# Patient Record
Sex: FEMALE | Race: WHITE | Employment: OTHER | URBAN - METROPOLITAN AREA
[De-identification: names, ages, dates, MRNs, and addresses within clinical notes are randomized per-mention and may not be internally consistent; named-entity substitution may affect disease eponyms.]

---

## 2021-03-22 ENCOUNTER — APPOINTMENT (OUTPATIENT)
Dept: GENERAL RADIOLOGY | Age: 76
DRG: 690 | End: 2021-03-22
Payer: MEDICARE

## 2021-03-22 ENCOUNTER — HOSPITAL ENCOUNTER (EMERGENCY)
Age: 76
Discharge: HOME | DRG: 690 | End: 2021-03-23
Payer: MEDICARE

## 2021-03-22 DIAGNOSIS — N39.0 URINARY TRACT INFECTION WITH HEMATURIA, SITE UNSPECIFIED: Primary | ICD-10-CM

## 2021-03-22 DIAGNOSIS — R31.9 URINARY TRACT INFECTION WITH HEMATURIA, SITE UNSPECIFIED: Primary | ICD-10-CM

## 2021-03-22 DIAGNOSIS — R11.2 NON-INTRACTABLE VOMITING WITH NAUSEA, UNSPECIFIED VOMITING TYPE: ICD-10-CM

## 2021-03-22 LAB
ALBUMIN SERPL-MCNC: 4 G/DL (ref 3.5–4.6)
ALP BLD-CCNC: 163 U/L (ref 40–130)
ALT SERPL-CCNC: 19 U/L (ref 0–33)
ANION GAP SERPL CALCULATED.3IONS-SCNC: 16 MEQ/L (ref 9–15)
AST SERPL-CCNC: 36 U/L (ref 0–35)
BACTERIA: ABNORMAL /HPF
BASOPHILS ABSOLUTE: 0 K/UL (ref 0–0.2)
BASOPHILS RELATIVE PERCENT: 0.4 %
BILIRUB SERPL-MCNC: 0.7 MG/DL (ref 0.2–0.7)
BILIRUBIN URINE: NEGATIVE
BLOOD, URINE: NEGATIVE
BUN BLDV-MCNC: 20 MG/DL (ref 8–23)
CALCIUM SERPL-MCNC: 9.2 MG/DL (ref 8.5–9.9)
CHLORIDE BLD-SCNC: 99 MEQ/L (ref 95–107)
CLARITY: CLEAR
CO2: 21 MEQ/L (ref 20–31)
COLOR: YELLOW
CREAT SERPL-MCNC: 1.24 MG/DL (ref 0.5–0.9)
EOSINOPHILS ABSOLUTE: 0.3 K/UL (ref 0–0.7)
EOSINOPHILS RELATIVE PERCENT: 2.1 %
EPITHELIAL CELLS, UA: ABNORMAL /HPF (ref 0–5)
GFR AFRICAN AMERICAN: 50.8
GFR NON-AFRICAN AMERICAN: 42
GLOBULIN: 3.1 G/DL (ref 2.3–3.5)
GLUCOSE BLD-MCNC: 101 MG/DL (ref 70–99)
GLUCOSE URINE: NEGATIVE MG/DL
HCT VFR BLD CALC: 41.5 % (ref 37–47)
HEMOGLOBIN: 13.7 G/DL (ref 12–16)
HYALINE CASTS: ABNORMAL /HPF (ref 0–5)
KETONES, URINE: NEGATIVE MG/DL
LEUKOCYTE ESTERASE, URINE: ABNORMAL
LIPASE: 30 U/L (ref 12–95)
LYMPHOCYTES ABSOLUTE: 1.2 K/UL (ref 1–4.8)
LYMPHOCYTES RELATIVE PERCENT: 10.6 %
MCH RBC QN AUTO: 29.6 PG (ref 27–31.3)
MCHC RBC AUTO-ENTMCNC: 32.9 % (ref 33–37)
MCV RBC AUTO: 89.7 FL (ref 82–100)
MONOCYTES ABSOLUTE: 0.6 K/UL (ref 0.2–0.8)
MONOCYTES RELATIVE PERCENT: 5.5 %
NEUTROPHILS ABSOLUTE: 9.5 K/UL (ref 1.4–6.5)
NEUTROPHILS RELATIVE PERCENT: 81.4 %
NITRITE, URINE: NEGATIVE
PDW BLD-RTO: 13.1 % (ref 11.5–14.5)
PH UA: 6 (ref 5–9)
PLATELET # BLD: 161 K/UL (ref 130–400)
POTASSIUM SERPL-SCNC: 3.4 MEQ/L (ref 3.4–4.9)
PROTEIN UA: 30 MG/DL
RBC # BLD: 4.62 M/UL (ref 4.2–5.4)
RBC UA: ABNORMAL /HPF (ref 0–5)
SODIUM BLD-SCNC: 136 MEQ/L (ref 135–144)
SPECIFIC GRAVITY UA: 1.01 (ref 1–1.03)
TOTAL PROTEIN: 7.1 G/DL (ref 6.3–8)
URINE REFLEX TO CULTURE: YES
UROBILINOGEN, URINE: 1 E.U./DL
WBC # BLD: 11.7 K/UL (ref 4.8–10.8)
WBC UA: >100 /HPF (ref 0–5)

## 2021-03-22 PROCEDURE — 83605 ASSAY OF LACTIC ACID: CPT

## 2021-03-22 PROCEDURE — 36415 COLL VENOUS BLD VENIPUNCTURE: CPT

## 2021-03-22 PROCEDURE — 2580000003 HC RX 258: Performed by: PHYSICIAN ASSISTANT

## 2021-03-22 PROCEDURE — 6360000002 HC RX W HCPCS: Performed by: PHYSICIAN ASSISTANT

## 2021-03-22 PROCEDURE — 71045 X-RAY EXAM CHEST 1 VIEW: CPT

## 2021-03-22 PROCEDURE — 87040 BLOOD CULTURE FOR BACTERIA: CPT

## 2021-03-22 PROCEDURE — 80053 COMPREHEN METABOLIC PANEL: CPT

## 2021-03-22 PROCEDURE — 6370000000 HC RX 637 (ALT 250 FOR IP): Performed by: PHYSICIAN ASSISTANT

## 2021-03-22 PROCEDURE — 87186 SC STD MICRODIL/AGAR DIL: CPT

## 2021-03-22 PROCEDURE — 83690 ASSAY OF LIPASE: CPT

## 2021-03-22 PROCEDURE — 81001 URINALYSIS AUTO W/SCOPE: CPT

## 2021-03-22 PROCEDURE — 87149 DNA/RNA DIRECT PROBE: CPT

## 2021-03-22 PROCEDURE — 85025 COMPLETE CBC W/AUTO DIFF WBC: CPT

## 2021-03-22 PROCEDURE — 87077 CULTURE AEROBIC IDENTIFY: CPT

## 2021-03-22 PROCEDURE — 87086 URINE CULTURE/COLONY COUNT: CPT

## 2021-03-22 RX ORDER — KETOROLAC TROMETHAMINE 15 MG/ML
15 INJECTION, SOLUTION INTRAMUSCULAR; INTRAVENOUS ONCE
Status: COMPLETED | OUTPATIENT
Start: 2021-03-22 | End: 2021-03-22

## 2021-03-22 RX ORDER — ONDANSETRON 4 MG/1
4 TABLET, ORALLY DISINTEGRATING ORAL ONCE
Status: COMPLETED | OUTPATIENT
Start: 2021-03-22 | End: 2021-03-22

## 2021-03-22 RX ORDER — 0.9 % SODIUM CHLORIDE 0.9 %
1000 INTRAVENOUS SOLUTION INTRAVENOUS ONCE
Status: COMPLETED | OUTPATIENT
Start: 2021-03-22 | End: 2021-03-22

## 2021-03-22 RX ADMIN — KETOROLAC TROMETHAMINE 15 MG: 15 INJECTION, SOLUTION INTRAMUSCULAR; INTRAVENOUS at 23:34

## 2021-03-22 RX ADMIN — ONDANSETRON 4 MG: 4 TABLET, ORALLY DISINTEGRATING ORAL at 21:06

## 2021-03-22 RX ADMIN — SODIUM CHLORIDE 1000 ML: 9 INJECTION, SOLUTION INTRAVENOUS at 21:55

## 2021-03-22 ASSESSMENT — PAIN SCALES - GENERAL
PAINLEVEL_OUTOF10: 7
PAINLEVEL_OUTOF10: 7

## 2021-03-23 ENCOUNTER — HOSPITAL ENCOUNTER (INPATIENT)
Age: 76
LOS: 3 days | Discharge: HOME | DRG: 690 | End: 2021-03-26
Attending: INTERNAL MEDICINE
Payer: MEDICARE

## 2021-03-23 VITALS
RESPIRATION RATE: 16 BRPM | DIASTOLIC BLOOD PRESSURE: 45 MMHG | TEMPERATURE: 98.6 F | HEART RATE: 99 BPM | OXYGEN SATURATION: 93 % | SYSTOLIC BLOOD PRESSURE: 105 MMHG

## 2021-03-23 DIAGNOSIS — R78.81 BACTEREMIA: Primary | ICD-10-CM

## 2021-03-23 DIAGNOSIS — N39.0 URINARY TRACT INFECTION WITHOUT HEMATURIA, SITE UNSPECIFIED: ICD-10-CM

## 2021-03-23 LAB
ANION GAP SERPL CALCULATED.3IONS-SCNC: 13 MEQ/L (ref 9–15)
BASOPHILS ABSOLUTE: 0 K/UL (ref 0–0.2)
BASOPHILS RELATIVE PERCENT: 0.2 %
BUN BLDV-MCNC: 27 MG/DL (ref 8–23)
CALCIUM SERPL-MCNC: 8.9 MG/DL (ref 8.5–9.9)
CHLORIDE BLD-SCNC: 106 MEQ/L (ref 95–107)
CO2: 22 MEQ/L (ref 20–31)
CREAT SERPL-MCNC: 1.47 MG/DL (ref 0.5–0.9)
EOSINOPHILS ABSOLUTE: 0.1 K/UL (ref 0–0.7)
EOSINOPHILS RELATIVE PERCENT: 0.8 %
GFR AFRICAN AMERICAN: 41.8
GFR NON-AFRICAN AMERICAN: 34.5
GLUCOSE BLD-MCNC: 121 MG/DL (ref 70–99)
HCT VFR BLD CALC: 37 % (ref 37–47)
HEMOGLOBIN: 12 G/DL (ref 12–16)
LACTIC ACID: 1.4 MMOL/L (ref 0.5–2.2)
LYMPHOCYTES ABSOLUTE: 1.3 K/UL (ref 1–4.8)
LYMPHOCYTES RELATIVE PERCENT: 9.7 %
MCH RBC QN AUTO: 29.6 PG (ref 27–31.3)
MCHC RBC AUTO-ENTMCNC: 32.5 % (ref 33–37)
MCV RBC AUTO: 91.2 FL (ref 82–100)
MONOCYTES ABSOLUTE: 1.1 K/UL (ref 0.2–0.8)
MONOCYTES RELATIVE PERCENT: 7.9 %
NEUTROPHILS ABSOLUTE: 11.3 K/UL (ref 1.4–6.5)
NEUTROPHILS RELATIVE PERCENT: 81.4 %
PDW BLD-RTO: 13.7 % (ref 11.5–14.5)
PLATELET # BLD: 128 K/UL (ref 130–400)
POTASSIUM SERPL-SCNC: 3.6 MEQ/L (ref 3.4–4.9)
RBC # BLD: 4.05 M/UL (ref 4.2–5.4)
SARS-COV-2, NAAT: NOT DETECTED
SODIUM BLD-SCNC: 141 MEQ/L (ref 135–144)
WBC # BLD: 13.9 K/UL (ref 4.8–10.8)

## 2021-03-23 PROCEDURE — 2580000003 HC RX 258: Performed by: PHYSICIAN ASSISTANT

## 2021-03-23 PROCEDURE — 2060000000 HC ICU INTERMEDIATE R&B

## 2021-03-23 PROCEDURE — 80048 BASIC METABOLIC PNL TOTAL CA: CPT

## 2021-03-23 PROCEDURE — 6360000002 HC RX W HCPCS: Performed by: PHYSICIAN ASSISTANT

## 2021-03-23 PROCEDURE — 99283 EMERGENCY DEPT VISIT LOW MDM: CPT

## 2021-03-23 PROCEDURE — 85025 COMPLETE CBC W/AUTO DIFF WBC: CPT

## 2021-03-23 PROCEDURE — 36415 COLL VENOUS BLD VENIPUNCTURE: CPT

## 2021-03-23 PROCEDURE — 6360000002 HC RX W HCPCS: Performed by: INTERNAL MEDICINE

## 2021-03-23 PROCEDURE — 87635 SARS-COV-2 COVID-19 AMP PRB: CPT

## 2021-03-23 PROCEDURE — 6370000000 HC RX 637 (ALT 250 FOR IP): Performed by: NURSE PRACTITIONER

## 2021-03-23 PROCEDURE — 6370000000 HC RX 637 (ALT 250 FOR IP): Performed by: INTERNAL MEDICINE

## 2021-03-23 PROCEDURE — 2580000003 HC RX 258: Performed by: INTERNAL MEDICINE

## 2021-03-23 RX ORDER — ALPRAZOLAM 1 MG/1
0.5 TABLET ORAL NIGHTLY PRN
Status: DISCONTINUED | OUTPATIENT
Start: 2021-03-24 | End: 2021-03-23

## 2021-03-23 RX ORDER — ALPRAZOLAM 0.5 MG/1
0.5 TABLET ORAL 2 TIMES DAILY PRN
COMMUNITY

## 2021-03-23 RX ORDER — VITAMIN B COMPLEX
2000 TABLET ORAL DAILY
Status: DISCONTINUED | OUTPATIENT
Start: 2021-03-24 | End: 2021-03-26 | Stop reason: HOSPADM

## 2021-03-23 RX ORDER — 0.9 % SODIUM CHLORIDE 0.9 %
500 INTRAVENOUS SOLUTION INTRAVENOUS ONCE
Status: COMPLETED | OUTPATIENT
Start: 2021-03-23 | End: 2021-03-23

## 2021-03-23 RX ORDER — TRAZODONE HYDROCHLORIDE 50 MG/1
50 TABLET ORAL NIGHTLY
COMMUNITY

## 2021-03-23 RX ORDER — TRAZODONE HYDROCHLORIDE 50 MG/1
50 TABLET ORAL NIGHTLY
Status: DISCONTINUED | OUTPATIENT
Start: 2021-03-23 | End: 2021-03-26 | Stop reason: HOSPADM

## 2021-03-23 RX ORDER — INDAPAMIDE 1.25 MG/1
1.25 TABLET, FILM COATED ORAL EVERY MORNING
COMMUNITY

## 2021-03-23 RX ORDER — ONDANSETRON 4 MG/1
4 TABLET, FILM COATED ORAL EVERY 8 HOURS PRN
Qty: 12 TABLET | Refills: 0 | Status: SHIPPED | OUTPATIENT
Start: 2021-03-23

## 2021-03-23 RX ORDER — ALPRAZOLAM 0.25 MG/1
0.5 TABLET ORAL NIGHTLY PRN
Status: DISCONTINUED | OUTPATIENT
Start: 2021-03-23 | End: 2021-03-26 | Stop reason: HOSPADM

## 2021-03-23 RX ORDER — CEPHALEXIN 500 MG/1
500 CAPSULE ORAL 2 TIMES DAILY
Qty: 20 CAPSULE | Refills: 0 | Status: ON HOLD | OUTPATIENT
Start: 2021-03-23 | End: 2021-03-26 | Stop reason: HOSPADM

## 2021-03-23 RX ORDER — L. ACIDOPHILUS/L.BULGARICUS 1MM CELL
1 TABLET ORAL DAILY
Status: DISCONTINUED | OUTPATIENT
Start: 2021-03-24 | End: 2021-03-26 | Stop reason: HOSPADM

## 2021-03-23 RX ORDER — ROSUVASTATIN CALCIUM 40 MG/1
40 TABLET, COATED ORAL DAILY
Status: DISCONTINUED | OUTPATIENT
Start: 2021-03-24 | End: 2021-03-26 | Stop reason: HOSPADM

## 2021-03-23 RX ORDER — IBUPROFEN 200 MG
600 TABLET ORAL EVERY 6 HOURS PRN
COMMUNITY

## 2021-03-23 RX ORDER — SODIUM CHLORIDE, SODIUM LACTATE, POTASSIUM CHLORIDE, CALCIUM CHLORIDE 600; 310; 30; 20 MG/100ML; MG/100ML; MG/100ML; MG/100ML
INJECTION, SOLUTION INTRAVENOUS CONTINUOUS
Status: DISCONTINUED | OUTPATIENT
Start: 2021-03-23 | End: 2021-03-25

## 2021-03-23 RX ORDER — ROSUVASTATIN CALCIUM 40 MG/1
40 TABLET, COATED ORAL DAILY
COMMUNITY

## 2021-03-23 RX ORDER — ACETAMINOPHEN 325 MG/1
650 TABLET ORAL EVERY 4 HOURS PRN
Status: DISCONTINUED | OUTPATIENT
Start: 2021-03-23 | End: 2021-03-26 | Stop reason: HOSPADM

## 2021-03-23 RX ORDER — ONDANSETRON 2 MG/ML
4 INJECTION INTRAMUSCULAR; INTRAVENOUS EVERY 6 HOURS PRN
Status: DISCONTINUED | OUTPATIENT
Start: 2021-03-23 | End: 2021-03-26 | Stop reason: HOSPADM

## 2021-03-23 RX ADMIN — ONDANSETRON 4 MG: 2 INJECTION INTRAMUSCULAR; INTRAVENOUS at 23:40

## 2021-03-23 RX ADMIN — SODIUM CHLORIDE 500 ML: 9 INJECTION, SOLUTION INTRAVENOUS at 00:48

## 2021-03-23 RX ADMIN — CEFTRIAXONE SODIUM 1000 MG: 1 INJECTION, POWDER, FOR SOLUTION INTRAMUSCULAR; INTRAVENOUS at 23:31

## 2021-03-23 RX ADMIN — SODIUM CHLORIDE, POTASSIUM CHLORIDE, SODIUM LACTATE AND CALCIUM CHLORIDE: 600; 310; 30; 20 INJECTION, SOLUTION INTRAVENOUS at 23:31

## 2021-03-23 RX ADMIN — ACETAMINOPHEN 650 MG: 325 TABLET ORAL at 23:40

## 2021-03-23 RX ADMIN — TRAZODONE HYDROCHLORIDE 50 MG: 50 TABLET ORAL at 23:30

## 2021-03-23 RX ADMIN — CEFTRIAXONE SODIUM 1000 MG: 1 INJECTION, POWDER, FOR SOLUTION INTRAMUSCULAR; INTRAVENOUS at 00:27

## 2021-03-23 RX ADMIN — PIPERACILLIN AND TAZOBACTAM 3375 MG: 3; .375 INJECTION, POWDER, FOR SOLUTION INTRAVENOUS at 21:17

## 2021-03-23 RX ADMIN — ALPRAZOLAM 0.5 MG: 0.25 TABLET ORAL at 23:31

## 2021-03-23 RX ADMIN — VANCOMYCIN HYDROCHLORIDE 1000 MG: 1 INJECTION, POWDER, LYOPHILIZED, FOR SOLUTION INTRAVENOUS at 21:50

## 2021-03-23 ASSESSMENT — ENCOUNTER SYMPTOMS
ABDOMINAL DISTENTION: 0
ABDOMINAL DISTENTION: 0
CHEST TIGHTNESS: 0
ANAL BLEEDING: 0
NAUSEA: 1
VOMITING: 1
BACK PAIN: 1
APNEA: 0
APNEA: 0
ABDOMINAL PAIN: 0
PHOTOPHOBIA: 0
VOICE CHANGE: 0
EYE DISCHARGE: 0
EYE DISCHARGE: 0
SHORTNESS OF BREATH: 0

## 2021-03-23 ASSESSMENT — PAIN DESCRIPTION - PROGRESSION: CLINICAL_PROGRESSION: NOT CHANGED

## 2021-03-23 ASSESSMENT — PAIN DESCRIPTION - ORIENTATION: ORIENTATION: RIGHT;LEFT;UPPER

## 2021-03-23 ASSESSMENT — PAIN DESCRIPTION - DESCRIPTORS: DESCRIPTORS: ACHING

## 2021-03-23 NOTE — ED PROVIDER NOTES
[address] [facility] ED  eMERGENCY dEPARTMENT eNCOUnter      Pt Name: Bill Page  MRN: [id number]  [place] 1945  Date of evaluation: 3/22/2021  Provider: Rose Hall PA-C    CHIEF COMPLAINT       Chief Complaint   Patient presents with    Fever     fever and chills  since yesterday      states it might be a uti          HISTORY OF PRESENT ILLNESS   (Location/Symptom, Timing/Onset,Context/Setting, Quality, Duration, Modifying Factors, Severity)  Note limiting factors. Bill Page is a 67 y.o. female who presents to the emergency department patient has fever chills since yesterday single episode of nausea vomiting emergency room states this feels like her last UTI she does have diffuse arthritis in her joints. So she does have joint pain denies chest pain shortness of breath diarrhea. Symptoms moderate in severity nothing improves or worsens her symptoms    HPI    NursingNotes were reviewed. REVIEW OF SYSTEMS    (2-9 systems for level 4, 10 or more for level 5)     Review of Systems   Constitutional: Positive for chills and fever. Negative for activity change, appetite change and unexpected weight change. HENT: Negative for ear discharge, nosebleeds and voice change. Eyes: Negative for photophobia and discharge. Respiratory: Negative for apnea and shortness of breath. Cardiovascular: Negative for chest pain. Gastrointestinal: Positive for nausea and vomiting. Negative for abdominal distention, abdominal pain and anal bleeding. Endocrine: Negative for cold intolerance, heat intolerance and polyphagia. Genitourinary: Negative for difficulty urinating and genital sores. Musculoskeletal: Positive for arthralgias and back pain. Negative for joint swelling. Skin: Negative for pallor. Allergic/Immunologic: Negative for immunocompromised state. Neurological: Negative for seizures and facial asymmetry. Hematological: Does not bruise/bleed easily. either signs or Co-signsthis chart in the absence of a cardiologist.         RADIOLOGY:   Kelsey Francis such as CT, Ultrasound and MRI are read by the radiologist. Plain radiographic images are visualized and preliminarily interpreted by the emergency physician with the below findings:    neg    Interpretation per the Radiologist below, if available at the time ofthis note:    XR CHEST PORTABLE    (Results Pending)         ED BEDSIDE ULTRASOUND:   Performed by ED Physician - none    LABS:  Labs Reviewed   URINE RT REFLEX TO CULTURE - Abnormal; Notable for the following components:       Result Value    Protein, UA 30 (*)     Leukocyte Esterase, Urine MODERATE (*)     All other components within normal limits   COMPREHENSIVE METABOLIC PANEL - Abnormal; Notable for the following components:    Anion Gap 16 (*)     Glucose 101 (*)     CREATININE 1.24 (*)     GFR Non- 42.0 (*)     GFR  50.8 (*)     Alkaline Phosphatase 163 (*)     AST 36 (*)     All other components within normal limits   CBC WITH AUTO DIFFERENTIAL - Abnormal; Notable for the following components:    WBC 11.7 (*)     MCHC 32.9 (*)     Neutrophils Absolute 9.5 (*)     All other components within normal limits   MICROSCOPIC URINALYSIS - Abnormal; Notable for the following components:    Bacteria, UA MANY (*)     WBC, UA >100 (*)     All other components within normal limits   CULTURE, URINE   CULTURE, BLOOD 1   CULTURE, BLOOD 2   LIPASE   LACTIC ACID, PLASMA       All other labs were within normal range or not returned as of this dictation.     EMERGENCY DEPARTMENT COURSE and DIFFERENTIAL DIAGNOSIS/MDM:   Vitals:    Vitals:    03/22/21 2330 03/23/21 0000 03/23/21 0030 03/23/21 0101   BP: 107/62 (!) 114/53 (!) 111/45 (!) 105/45   Pulse: 120 109 106 99   Resp:       Temp:       SpO2:  98% 92% 93%            MDM  Number of Diagnoses or Management Options  Non-intractable vomiting with nausea, unspecified vomiting type  Urinary tract infection with hematuria, site unspecified  Diagnosis management comments: Discussed with Dr. Christian Rubalcava patient's heart rate is improving  Patient heart rate below 100 with fluid she feels better wants discharged home we will treat for UTI. She had a history of E. coli in the past.  Follow-up primary care return to if any symptoms worsen or new symptoms develop       Amount and/or Complexity of Data Reviewed  Clinical lab tests: reviewed and ordered  Tests in the radiology section of CPT®: reviewed and ordered  Discuss the patient with other providers: yes        CRITICAL CARE TIME       CONSULTS:  None    PROCEDURES:  Unless otherwise noted below, none     Procedures    FINAL IMPRESSION      1. Urinary tract infection with hematuria, site unspecified    2.  Non-intractable vomiting with nausea, unspecified vomiting type          DISPOSITION/PLAN   DISPOSITION Decision To Discharge 03/23/2021 01:16:59 AM      PATIENT REFERRED TO:  Ronald Durham MD  [address] [place] [address] [phone]    Call in 1 day      [facility]) ED  [address] [address]  [phone]    If symptoms worsen      DISCHARGE MEDICATIONS:  Discharge Medication List as of 3/23/2021  1:19 AM      START taking these medications    Details   cephALEXin (KEFLEX) 500 MG capsule Take 1 capsule by mouth 2 times daily for 10 days, Disp-20 capsule, R-0Print      ondansetron (ZOFRAN) 4 MG tablet Take 1 tablet by mouth every 8 hours as needed for Nausea, Disp-12 tablet, R-0Print                (Please note that portions of this note were completed with a voice recognition program.  Efforts were made to edit the dictations but occasionally words are mis-transcribed.)    Raul Rosenbaum PA-C (electronically signed)  Attending Emergency Physician       Raul Rosenbaum PA-C  03/23/21 0204

## 2021-03-23 NOTE — ED NOTES
Discharge education reviewed verbally and in writing. Instructed to follow up with PCP and come back to the ED with any new or worsening symptoms. No questions or concerns at this time. No s/s of distress noted at this time.         Kassandra Aponte RN  03/23/21 3990

## 2021-03-23 NOTE — CARE COORDINATION
3/23/2021 at 1800 - This RN was notified by Zulay in lab that patient has critical lab results. Results received and confirmed with lab via verbal read-back. Positive blood cultures then reported to [facility] PA - gram negative rods isolated in 2/2 cultures. Manuela Said will contact the patient.

## 2021-03-23 NOTE — ED NOTES
Pt awake and alert. Pt reports she feels better and is ready to go home. No s/s of distress noted at this time. No concerns or needs at this time. Will continue to monitor.         Merle Salinas RN  03/22/21 2509

## 2021-03-23 NOTE — ED NOTES
Addendum patient has positive E. coli and blood cultures we contacted her today she has had fever in excess of 100 Fahrenheit she had a headache symptoms are controlled with ibuprofen patient noted to have 2+ blood cultures for Dr. Donnie Rolon recommendation will return patient here for IV antibiotics and admission. Patient advised this will be turned to ER.      Francia Sam PA-C  03/23/21 1923

## 2021-03-23 NOTE — ED NOTES
Pt awake and alert. No s/s of distress noted at this time. No concerns or needs at this time. Will continue to monitor.         Sophia Leone RN  03/22/21 9078

## 2021-03-23 NOTE — ED NOTES
Pt in bed with eyes closed. Respirations are even and unlabored. No s/s of distress noted at this time. Will continue to monitor.         Jaycee Duron, MARY  03/22/21 5566

## 2021-03-23 NOTE — ED NOTES
Pt awake and alert. No s/s of distress noted at this time. No concerns or needs at this time. Will continue to monitor.         Marleni Angulo RN  03/22/21 3902

## 2021-03-24 ENCOUNTER — APPOINTMENT (OUTPATIENT)
Dept: ULTRASOUND IMAGING | Age: 76
DRG: 690 | End: 2021-03-24
Payer: MEDICARE

## 2021-03-24 LAB
ALBUMIN SERPL-MCNC: 3.3 G/DL (ref 3.5–4.6)
ALP BLD-CCNC: 149 U/L (ref 40–130)
ALT SERPL-CCNC: 14 U/L (ref 0–33)
ANION GAP SERPL CALCULATED.3IONS-SCNC: 17 MEQ/L (ref 9–15)
AST SERPL-CCNC: 20 U/L (ref 0–35)
BASOPHILS ABSOLUTE: 0 K/UL (ref 0–0.2)
BASOPHILS RELATIVE PERCENT: 0.4 %
BILIRUB SERPL-MCNC: 1 MG/DL (ref 0.2–0.7)
BUN BLDV-MCNC: 26 MG/DL (ref 8–23)
CALCIUM SERPL-MCNC: 8.6 MG/DL (ref 8.5–9.9)
CHLORIDE BLD-SCNC: 99 MEQ/L (ref 95–107)
CO2: 21 MEQ/L (ref 20–31)
CREAT SERPL-MCNC: 1.38 MG/DL (ref 0.5–0.9)
EOSINOPHILS ABSOLUTE: 0.4 K/UL (ref 0–0.7)
EOSINOPHILS RELATIVE PERCENT: 3 %
GFR AFRICAN AMERICAN: 44.9
GFR NON-AFRICAN AMERICAN: 37.1
GLOBULIN: 3 G/DL (ref 2.3–3.5)
GLUCOSE BLD-MCNC: 111 MG/DL (ref 70–99)
HCT VFR BLD CALC: 34.6 % (ref 37–47)
HEMOGLOBIN: 11.8 G/DL (ref 12–16)
LYMPHOCYTES ABSOLUTE: 1.6 K/UL (ref 1–4.8)
LYMPHOCYTES RELATIVE PERCENT: 12 %
MCH RBC QN AUTO: 30.5 PG (ref 27–31.3)
MCHC RBC AUTO-ENTMCNC: 34 % (ref 33–37)
MCV RBC AUTO: 89.9 FL (ref 82–100)
MONOCYTES ABSOLUTE: 1.2 K/UL (ref 0.2–0.8)
MONOCYTES RELATIVE PERCENT: 8.6 %
NEUTROPHILS ABSOLUTE: 10.4 K/UL (ref 1.4–6.5)
NEUTROPHILS RELATIVE PERCENT: 76 %
PDW BLD-RTO: 13.5 % (ref 11.5–14.5)
PLATELET # BLD: 135 K/UL (ref 130–400)
POTASSIUM SERPL-SCNC: 3.4 MEQ/L (ref 3.4–4.9)
RBC # BLD: 3.85 M/UL (ref 4.2–5.4)
SODIUM BLD-SCNC: 137 MEQ/L (ref 135–144)
TOTAL PROTEIN: 6.3 G/DL (ref 6.3–8)
WBC # BLD: 13.6 K/UL (ref 4.8–10.8)

## 2021-03-24 PROCEDURE — 6370000000 HC RX 637 (ALT 250 FOR IP): Performed by: NURSE PRACTITIONER

## 2021-03-24 PROCEDURE — 36415 COLL VENOUS BLD VENIPUNCTURE: CPT

## 2021-03-24 PROCEDURE — 80053 COMPREHEN METABOLIC PANEL: CPT

## 2021-03-24 PROCEDURE — 2060000000 HC ICU INTERMEDIATE R&B

## 2021-03-24 PROCEDURE — 85025 COMPLETE CBC W/AUTO DIFF WBC: CPT

## 2021-03-24 PROCEDURE — 6370000000 HC RX 637 (ALT 250 FOR IP): Performed by: INTERNAL MEDICINE

## 2021-03-24 PROCEDURE — 99222 1ST HOSP IP/OBS MODERATE 55: CPT | Performed by: INTERNAL MEDICINE

## 2021-03-24 PROCEDURE — 2580000003 HC RX 258: Performed by: INTERNAL MEDICINE

## 2021-03-24 PROCEDURE — 6360000002 HC RX W HCPCS: Performed by: INTERNAL MEDICINE

## 2021-03-24 PROCEDURE — 87040 BLOOD CULTURE FOR BACTERIA: CPT

## 2021-03-24 PROCEDURE — 76775 US EXAM ABDO BACK WALL LIM: CPT

## 2021-03-24 RX ORDER — TRAMADOL HYDROCHLORIDE 50 MG/1
50 TABLET ORAL EVERY 6 HOURS PRN
Status: DISCONTINUED | OUTPATIENT
Start: 2021-03-24 | End: 2021-03-26 | Stop reason: HOSPADM

## 2021-03-24 RX ADMIN — ACETAMINOPHEN 650 MG: 325 TABLET ORAL at 16:49

## 2021-03-24 RX ADMIN — TRAZODONE HYDROCHLORIDE 50 MG: 50 TABLET ORAL at 20:20

## 2021-03-24 RX ADMIN — CEFTRIAXONE SODIUM 1000 MG: 1 INJECTION, POWDER, FOR SOLUTION INTRAMUSCULAR; INTRAVENOUS at 23:38

## 2021-03-24 RX ADMIN — SODIUM CHLORIDE, POTASSIUM CHLORIDE, SODIUM LACTATE AND CALCIUM CHLORIDE: 600; 310; 30; 20 INJECTION, SOLUTION INTRAVENOUS at 16:49

## 2021-03-24 RX ADMIN — LACTOBACILLUS TAB 1 TABLET: TAB at 08:32

## 2021-03-24 RX ADMIN — ACETAMINOPHEN 650 MG: 325 TABLET ORAL at 08:38

## 2021-03-24 RX ADMIN — ROSUVASTATIN CALCIUM 40 MG: 40 TABLET, FILM COATED ORAL at 11:41

## 2021-03-24 RX ADMIN — ENOXAPARIN SODIUM 40 MG: 40 INJECTION SUBCUTANEOUS at 08:32

## 2021-03-24 RX ADMIN — Medication 2000 UNITS: at 08:32

## 2021-03-24 RX ADMIN — SERTRALINE HYDROCHLORIDE 50 MG: 50 TABLET ORAL at 08:32

## 2021-03-24 RX ADMIN — TRAMADOL HYDROCHLORIDE 50 MG: 50 TABLET, FILM COATED ORAL at 11:40

## 2021-03-24 ASSESSMENT — ENCOUNTER SYMPTOMS
EYE DISCHARGE: 0
ABDOMINAL DISTENTION: 0
NAUSEA: 1
ABDOMINAL PAIN: 1
ANAL BLEEDING: 0
PHOTOPHOBIA: 0
APNEA: 0
VOICE CHANGE: 0

## 2021-03-24 ASSESSMENT — PAIN SCALES - GENERAL
PAINLEVEL_OUTOF10: 4
PAINLEVEL_OUTOF10: 7

## 2021-03-24 NOTE — PROGRESS NOTES
Physician Progress Note    3/24/2021   12:32 PM    Name:  Pearlene Moritz  MRN:    [id number]     IP Day: 1     Admit Date: 3/23/2021  8:20 PM  PCP: Rosa Kong MD    Code Status:  Full Code      Assessment and Plan: Active Problems/ diagnosis:     Gram-negative bacteremia  UTI present on admission  Generalized weakness due to above  Renal insufficiency versus CKD  Sepsis was ruled out  Hypertension  Hyperlipidemia  CRISTEL  History of depression  DJD/osteoporosis/osteoarthritis    Plan  3/24/21-  -patient is afebrile and hemodynamically stable with IV Rocephin. Will recheck blood cultures  -Infectious disease has been consulted  -Resume home medication  -We will start the patient on as needed Ultram for pain  -Recheck a.m. labs  -Anticipate discharge home once okay by infectious disease and once her blood cultures are negative    DVT PPx     Subjective:     No fever or chills. Feels generally weak. No nausea or vomiting or abdominal pain. No polyuria or dysuria.     Physical Examination:      Vitals:  BP (!) 112/55   Pulse 86   Temp 98.5 °F (36.9 °C) (Oral)   Resp 18   Ht 4' 10\" (1.473 m)   Wt 132 lb 11.5 oz (60.2 kg)   SpO2 99%   BMI 27.74 kg/m²   Temp (24hrs), Av.5 °F (36.9 °C), Min:98.5 °F (36.9 °C), Max:98.5 °F (36.9 °C)      General appearance: alert, cooperative and no distress  Mental Status: oriented to person, place and time and normal affect  Lungs: clear to auscultation bilaterally, normal effort  Heart: regular rate and rhythm, no murmur  Abdomen: soft, nontender, nondistended, bowel sounds present, no masses  Extremities: no edema, redness, tenderness in the calves  Skin: no gross lesions, rashes  Neurologically: Alert to x3, nonfocal    Data:     Labs:  Recent Labs     21  2030 21  0607   WBC 13.9* 13.6*   HGB 12.0 11.8*   * 135     Recent Labs     21  2030 21  0607    137   K 3.6 3.4    99   CO2 22 21   BUN 27* 26*   CREATININE 1.47* 1.38* providers. In addition to examining and evaluating pt, I spent additional time explaining care, normaland abnormal findings, and treatment plan. All of pt questions were answered. Counseling, diet and education were provided. Case will be discussed with nursing staff when appropriate. Family will be updated if and when appropriate.        Electronically signed by Mary Garsia DO on 3/24/2021 at 12:32 PM

## 2021-03-24 NOTE — ED PROVIDER NOTES
[address]  eMERGENCY dEPARTMENT eNCOUnter      Pt Name: Merlyn Lynch  MRN: [id number]  [place] 1945  Date of evaluation: 3/23/2021  Provider: Janice Kimbrough PA-C    CHIEF COMPLAINT       Chief Complaint   Patient presents with    Other     abnormal labs, [facility] HISTORY OF PRESENT ILLNESS   (Location/Symptom, Timing/Onset,Context/Setting, Quality, Duration, Modifying Factors, Severity)  Note limiting factors. Merlyn Lynch is a 67 y.o. female who presents to the emergency department patient positive blood cultures had fever this morning did note a headache she did improve. However with 2+ blood cultures he was advised return to ER for admission discussed Dr. Denzel Delacruz will admit patient patient denies any chills or vomiting today she does have chronic joint pain. Does have history of UTI started on Rocephin and Keflex yesterday. Symptoms moderate severity nothing improves or worsen symptoms. HPI    NursingNotes were reviewed. REVIEW OF SYSTEMS    (2-9 systems for level 4, 10 or more for level 5)     Review of Systems   Constitutional: Negative for activity change, appetite change and unexpected weight change. HENT: Negative for ear discharge, nosebleeds and voice change. Eyes: Negative for photophobia and discharge. Respiratory: Negative for apnea. Cardiovascular: Negative for chest pain. Gastrointestinal: Negative for abdominal distention and anal bleeding. Endocrine: Negative for cold intolerance, heat intolerance and polyphagia. Genitourinary: Negative for genital sores. Musculoskeletal: Negative for joint swelling. Skin: Negative for pallor. Allergic/Immunologic: Negative for immunocompromised state. Neurological: Negative for seizures and facial asymmetry. Hematological: Does not bruise/bleed easily. Psychiatric/Behavioral: Negative for behavioral problems, self-injury and sleep disturbance.    All other systems reviewed and are negative. Except as noted above the remainder of the review of systems was reviewed and negative. PAST MEDICAL HISTORY   History reviewed. No pertinent past medical history. SURGICALHISTORY     History reviewed. No pertinent surgical history. CURRENT MEDICATIONS       Current Discharge Medication List      CONTINUE these medications which have NOT CHANGED    Details   cephALEXin (KEFLEX) 500 MG capsule Take 1 capsule by mouth 2 times daily for 10 days  Qty: 20 capsule, Refills: 0      ondansetron (ZOFRAN) 4 MG tablet Take 1 tablet by mouth every 8 hours as needed for Nausea  Qty: 12 tablet, Refills: 0      Cholecalciferol 50 MCG (2000 UT) TABS Take 2,000 Units by mouth daily      sertraline (ZOLOFT) 50 MG tablet Take 50 mg by mouth daily      traZODone (DESYREL) 50 MG tablet Take 50 mg by mouth nightly      indapamide (LOZOL) 1.25 MG tablet Take 1.25 mg by mouth every morning      rosuvastatin (CRESTOR) 40 MG tablet Take 40 mg by mouth daily      ALPRAZolam (XANAX) 0.5 MG tablet Take 0.5 mg by mouth 2 times daily as needed for Sleep or Anxiety. Probiotic Product (PROBIOTIC ADVANCED PO) Take 1 tablet by mouth      ibuprofen (ADVIL) 200 MG tablet Take 600 mg by mouth every 6 hours as needed for Pain             ALLERGIES     Aspirin, Atorvastatin calcium, Ezetimibe-simvastatin, and Pravastatin sodium    FAMILY HISTORY     History reviewed. No pertinent family history.        SOCIAL HISTORY       Social History     Socioeconomic History    Marital status:      Spouse name: None    Number of children: None    Years of education: None    Highest education level: None   Occupational History    None   Social Needs    Financial resource strain: None    Food insecurity     Worry: None     Inability: None    Transportation needs     Medical: None     Non-medical: None   Tobacco Use    Smoking status: Never Smoker    Smokeless tobacco: Never Used   Substance and Sexual Activity  Alcohol use: Yes     Comment: occasional    Drug use: Never    Sexual activity: Not Currently   Lifestyle    Physical activity     Days per week: None     Minutes per session: None    Stress: None   Relationships    Social connections     Talks on phone: None     Gets together: None     Attends Jewish service: None     Active member of club or organization: None     Attends meetings of clubs or organizations: None     Relationship status: None    Intimate partner violence     Fear of current or ex partner: None     Emotionally abused: None     Physically abused: None     Forced sexual activity: None   Other Topics Concern    None   Social History Narrative    None       SCREENINGS    [place] Coma Scale  Eye Opening: Spontaneous  Best Verbal Response: Oriented  Best Motor Response: Obeys commands  Kaye Coma Scale Score: 15 @FLOW([id number])@      PHYSICAL EXAM    (up to 7 for level 4, 8 or more for level 5)     ED Triage Vitals [03/23/21 2017]   BP Temp Temp Source Pulse Resp SpO2 Height Weight   125/73 98.5 °F (36.9 °C) Oral 88 20 95 % 4' 10\" (1.473 m) 136 lb (61.7 kg)       Physical Exam  Vitals signs and nursing note reviewed. Constitutional:       General: She is not in acute distress. Appearance: She is well-developed. She is not ill-appearing. HENT:      Head: Normocephalic and atraumatic. Right Ear: External ear normal.      Left Ear: External ear normal.      Nose: Nose normal.      Mouth/Throat:      Mouth: Mucous membranes are moist.      Pharynx: No oropharyngeal exudate or posterior oropharyngeal erythema. Eyes:      General:         Right eye: No discharge. Left eye: No discharge. Extraocular Movements: Extraocular movements intact. Pupils: Pupils are equal, round, and reactive to light. Neck:      Musculoskeletal: Normal range of motion and neck supple. Cardiovascular:      Rate and Rhythm: Normal rate and regular rhythm. Pulses: Normal pulses. DEPARTMENT COURSE and DIFFERENTIAL DIAGNOSIS/MDM:   Vitals:    Vitals:    03/23/21 2017 03/23/21 2130 03/23/21 2203 03/23/21 2218   BP: 125/73 (!) 113/47 (!) 103/48    Pulse: 88 75 86    Resp: 20  18    Temp: 98.5 °F (36.9 °C)  98.5 °F (36.9 °C)    TempSrc: Oral  Oral    SpO2: 95% 96% 99%    Weight: 136 lb (61.7 kg)   132 lb 14.4 oz (60.3 kg)   Height: 4' 10\" (1.473 m)   4' 10\" (1.473 m)            MDM  Number of Diagnoses or Management Options  Bacteremia  Urinary tract infection without hematuria, site unspecified  Diagnosis management comments: Dr. Katherine Mehta evaluated patient in emergency room will admit patient. Amount and/or Complexity of Data Reviewed  Clinical lab tests: ordered        CRITICAL CARE TIME   Total Critical Care time was   minutes, excluding separately reportableprocedures. There was a high probability of clinicallysignificant/life threatening deterioration in the patient's condition which required my urgent intervention. CONSULTS:  IP CONSULT TO INFECTIOUS DISEASES    PROCEDURES:  Unless otherwise noted below, none     Procedures    FINAL IMPRESSION      1. Bacteremia    2. Urinary tract infection without hematuria, site unspecified          DISPOSITION/PLAN   DISPOSITION Admitted 03/23/2021 08:47:36 PM      PATIENT REFERRED TO:  No follow-up provider specified.     DISCHARGE MEDICATIONS:  Current Discharge Medication List             (Please note that portions of this note were completed with a voice recognition program.  Efforts were made to edit the dictations but occasionally words are mis-transcribed.)    Kenzie Dumont PA-C (electronically signed)  Attending Emergency Physician       Kenzie Dumont PA-C  03/24/21 0122

## 2021-03-24 NOTE — H&P
Patient comes in with fever and chills since yesterday to our ER. Had blood cultures done. Patient was diagnosed with UTI and was discharged on p.o. antibiotics. Patient called back to come back to the ER for positive blood cultures gram-negative piedad E. coli. Patient also has fever and chills and nausea no vomiting. No abdominal pain no chest pain or shortness of breath. Pt is feeling fatigued. History reviewed. No pertinent past medical history. History reviewed. No pertinent surgical history. Social History     Tobacco History     Smoking Status  Never Smoker    Smokeless Tobacco Use  Never Used          Alcohol History     Alcohol Use Status  Yes Comment  occasional          Drug Use     Drug Use Status  Never          Sexual Activity     Sexually Active  Not Currently              History reviewed. No pertinent family history. No current facility-administered medications on file prior to encounter. Current Outpatient Medications on File Prior to Encounter   Medication Sig Dispense Refill    cephALEXin (KEFLEX) 500 MG capsule Take 1 capsule by mouth 2 times daily for 10 days 20 capsule 0    ondansetron (ZOFRAN) 4 MG tablet Take 1 tablet by mouth every 8 hours as needed for Nausea 12 tablet 0     Lab Results   Component Value Date    WBC 11.7 (H) 03/22/2021    HGB 13.7 03/22/2021    HCT 41.5 03/22/2021    MCV 89.7 03/22/2021     03/22/2021     Lab Results   Component Value Date     03/22/2021    K 3.4 03/22/2021    CL 99 03/22/2021    CO2 21 03/22/2021    BUN 20 03/22/2021    CREATININE 1.24 03/22/2021    GLUCOSE 101 03/22/2021    CALCIUM 9.2 03/22/2021    Review of Systems   Constitutional: Positive for activity change, chills, fatigue and fever. HENT: Negative for congestion and dental problem. Eyes: Negative for discharge. Respiratory: Negative for apnea and chest tightness. Cardiovascular: Negative for chest pain. Gastrointestinal: Negative for abdominal distention. Endocrine: Negative for cold intolerance. Genitourinary: Negative for difficulty urinating. Musculoskeletal: Negative for arthralgias. Allergic/Immunologic: Negative for environmental allergies. Neurological: Negative for dizziness. Hematological: Negative for adenopathy. Psychiatric/Behavioral: Negative for agitation. Physical Exam  Constitutional:       Appearance: Normal appearance. She is ill-appearing. HENT:      Head: Normocephalic and atraumatic. Right Ear: Tympanic membrane normal.      Nose: No congestion. Mouth/Throat:      Mouth: Mucous membranes are moist.   Eyes:      Pupils: Pupils are equal, round, and reactive to light. Cardiovascular:      Rate and Rhythm: Normal rate. Heart sounds: No murmur. Pulmonary:      Effort: No respiratory distress. Breath sounds: No wheezing. Abdominal:      General: There is no distension. Tenderness: There is no abdominal tenderness. Musculoskeletal:         General: No swelling or tenderness. Skin:     General: Skin is warm and dry. Coloration: Skin is not jaundiced or pale. Neurological:      Mental Status: She is alert. Cranial Nerves: No cranial nerve deficit. 1) ecoli bacteremia from UTI  2) sepsis  3) UYEN vs CKD  Hold BP meds for now, IV fluids, ID evaluation. IV Rocephin for now. Follow sensitivity. Anticipate discharge within 2 to 3 days.  Renal u/s

## 2021-03-24 NOTE — ED TRIAGE NOTES
Patient presents with complaints of fever, chills, and headache this morning, and slight nausea at this present time. Patient was seen her yesterday and treated for UTI, was called by REBECCA Oliver, today and told to return for positive blood culture results. Patient states she feels better now than she did this morning. No distress noted in triage.

## 2021-03-24 NOTE — FLOWSHEET NOTE
2200-Pt to floor. Denies CP/SOB. Vitals stable on RA. Reports some discomfort upon urinating. Denies nausea at this time. No risk for falls. Oriented to room. Denies any other needs at this time. Call light in reach. Team to continue to monitor. 64-NP Jeremy notified for home med rec.  Electronically signed by Tuan Rubio RN on 3/23/2021 at 10:37 PM

## 2021-03-24 NOTE — CONSULTS
Infectious Diseases Inpatient Consult Note      Reason for Consult:   UTI with sepsis  Requesting Physician:   Kelsie Orourke  Primary Care Physician:  Sree Sanders MD  History Obtained From:   Pt, EPIC    Admit Date: 3/23/2021  Hospital Day: 2      HISTORY OF PRESENT ILLNESS:  This is a 67 y.o. female was admitted to [address] [place] [address]  from home  through ER after she was called for positive blood cultures. Patient came to the emergency room on 3/22 with fevers chills nausea vomiting and diffuse joint aches. Was found to have pyuria. Was discharged home on p.o. antibiotics to be called today for admission with worsening symptoms and positive blood and urine cultures for E. Coli. Currently on IV Rocephin that is well-tolerated. Patient does not feel well at all with persistent generalized body aches and chills. Poor appetite and generalized weakness and fatigue x 2 weeks. Patient reports previous history of UTIs. Received COVID-19 vaccine 3 weeks ago.   Past medical history significant for rotator cuff arthropathy of bilateral shoulders, dry eye syndrome, vitreous detachment of both eyes, osteoarthritis, vitamin D deficiency, osteoporosis, hypertension, hyperlipidemia, osteopenia, major depression, generalized anxiety disorder  Surgical history significant for bilateral cataract surgery, cholecystectomy, tonsillectomy and adenectomy, colonoscopy  Social history is negative for cigarette smoking no alcohol abuse or drug abuse  Lives with her  who is at bedside      Current Medications:     cefTRIAXone (ROCEPHIN) IV  1,000 mg Intravenous Q24H    enoxaparin  40 mg Subcutaneous Daily    Vitamin D  2,000 Units Oral Daily    lactobacillus acidophilus  1 tablet Oral Daily    rosuvastatin  40 mg Oral Daily    sertraline  50 mg Oral Daily    traZODone  50 mg Oral Nightly       Allergies:  Aspirin, Atorvastatin calcium, Ezetimibe-simvastatin, and Pravastatin sodium      Family History:   Noncontributory  Review of Systems   Constitutional: Positive for appetite change, chills, fatigue and fever. Gastrointestinal: Positive for abdominal pain and nausea. Genitourinary: Positive for dysuria. 14 system review is negative other than HPI    Physical Exam  Vitals:    03/23/21 2218 03/23/21 2335 03/24/21 0644 03/24/21 0727   BP:  (!) 112/55     Pulse:  86     Resp:    18   Temp:       TempSrc:    Oral   SpO2:       Weight: 132 lb 14.4 oz (60.3 kg)  132 lb 11.5 oz (60.2 kg)    Height: 4' 10\" (1.473 m)        General Appearance: alert and oriented to person, place and time, well-developed and well-nourished, in no acute distress  Skin: warm and dry, no rash. Head: normocephalic and atraumatic  Eyes: extraocular eye movements intact, conjunctivae normal, anicteric sclerae  ENT: oropharynx clear and moist with normal mucous membranes.  No thrush  Lungs: normal respiratory effort, Clear Lungs, no rhonchi, no crackles, no wheezes  Heart:RRR, nl S1/S2, no murmur  Abdomen: soft, suprapubic tenderness, no H-S-megaly, + BS  NEUROLOGICAL: alert and oriented x 3, no focal deficits  No leg edema  No erythema, no warmth, no tenderness        DATA:    Lab Results   Component Value Date    WBC 13.6 (H) 03/24/2021    HGB 11.8 (L) 03/24/2021    HCT 34.6 (L) 03/24/2021    MCV 89.9 03/24/2021     03/24/2021     Lab Results   Component Value Date    CREATININE 1.38 (H) 03/24/2021    BUN 26 (H) 03/24/2021     03/24/2021    K 3.4 03/24/2021    CL 99 03/24/2021    CO2 21 03/24/2021       Hepatic Function Panel:   Lab Results   Component Value Date    ALKPHOS 149 03/24/2021    ALT 14 03/24/2021    AST 20 03/24/2021    PROT 6.3 03/24/2021    BILITOT 1.0 03/24/2021    LABALBU 3.3 03/24/2021       Microbiology:   Recent Labs     03/22/21  2358   BC Gram stain anaerobic bottle  Gram negative rods  2 out of 2 blood cultures  Further results to follow  *  POSITIVE for  ID and sensitivity to follow       Recent Labs     03/22/21  8571 BLOODCULT2 Gram stain aerobic bottle  Gram stain anaerobic bottle  Gram negative rods  1 out of 2 blood cultures  Further results to follow  *  The following organisms and resistance markers have been  tested using nucleic acid testing technology. ORGANISMS:  Escherica coli, Klebisella pneumonia, Klebsiella oxytoca,  Pseudomonas aeruginosa, Enterobacter species, Proteus species,  Citrobacter species, Acinetobacter species. CARBAPENEM (CRE) RESISTANCE MARKERS:  kpc, imp, ndm, vim, oxa  ESBL RESISTANCE MARKER:  ctx-m    POSITIVE for  ID and sensitivity to follow       Recent Labs     03/22/21  2030   [facility] >100,000 CFU/ml  Sensitivity to follow       No results for input(s): WNDABS in the last 72 hours. No results for input(s): CULTRESP in the last 72 hours. Imaging:      Impression   No evidence of acute cardiopulmonary process.          3/24/2021  7:39 AM - [place], po Incoming Lab Results From Soft    Specimen Information: Blood        Component Collected Lab   Blood Culture, Routine Abnormal  03/22/2021 11:58 PM [address] [facility]   Gram stain anaerobic bottle   Gram negative rods   2 out of 2 blood cultures   Further results to follow    Organism Abnormal  03/22/2021 11:58 PM MH -     3/24/2021 10:20 AM - [place], po Incoming Lab Results From Soft    Specimen Information: Urine, clean catch        Component Collected Lab   Organism Abnormal  03/22/2021  8:30 PM [address] [facility]   Escherichia coli    Urine Culture, Routine 03/22/2021  8:30 PM [address] [facility]   >100,000 CFU/ml   Sensitivity to follow     3/22/2021  8:50 PM - [place], po Incoming Lab Results From Soft    Component Value Ref Range & Units Status Collected Lab   Bacteria, UA MANYAbnormal   Negative /HPF Final 03/22/2021  8:30 PM [address] [facility]   Hyaline Casts, UA 1-3  0 - 5 /HPF Final 03/22/2021  8:30 PM [address] [facility]   WBC, UA >100High   0 - 5 /HPF Final 03/22/2021  8:30 PM [address] [facility] RBC, UA 0-2  0 - 5 /HPF Final 03/22/2021  8:30 PM [address] [facility]   Epithelial Cells, UA 0-2  0 - 5 /HPF Final       pH, UA6.0 5.0 - 9.3Vfmil2703/22/2021  8:30 [address] [facility] Protein, [id number]  Negative mg/fTKrkgo1203/22/2021  8:30 [place], Urine1.0 <2.0 E.U./vXYtepw2803/22/2021  8:30 [address] [facility] Nitrite, UrineNegative KicpjtjzZdxmm32/22/2021  8:30 [address] [facility] Leukocyte Esterase, UrineMODERATEAbnormal  VqbxygazQjpev02/22/2021  8:30 [address] [facility] Urine Reflex to CultureYes Final   Impression:  No evidence for hydronephrosis. Small echogenic foci are seen in both kidneys that could represent vascular calcifications are possible stones.          IMPRESSION:    · E. coli UTI with sepsis  · Acute kidney injury    Patient Active Problem List   Diagnosis    Bacteremia       PLAN:  · IV Rocephin  · IV fluids  · Check blood and urine culture and sensitivity and accordingly adjust antibiotic therapy  · Follow-up CBC BMP    Discussed with patient, spouse and RN    Edwin Carter MD

## 2021-03-24 NOTE — FLOWSHEET NOTE
Patient in bed with sun glasses because she states that her head hurts and her eyes are sensitive to light. Ultram given per prn order.

## 2021-03-24 NOTE — PROGRESS NOTES
Physician Progress Note      PATIENT:               Prosper Sherwood  CSN #:                  [id number]  :                       1945  ADMIT DATE:       3/23/2021 8:20 PM  DISCH DATE:  RESPONDING  PROVIDER #:        Phyllis HOLM DO          QUERY TEXT:    Patient admitted with E coli bacteremia from UTI and UYEN vs CKD. Noted   documentation of sepsis in H&P. In order to support the diagnosis of sepsis,   please include additional clinical indicators in your documentation. Or   please document if the diagnosis of sepsis has been ruled out after further   study    The medical record reflects the following:  Risk Factors: age 67  'no pertinent PMH'  Clinical Indicators: 3/24/2021  7:38 AM  Culture, Blood 2 Escherichia coli DNA   Detected   no urine culture thus far  WBC 13.9  13.6   BUN/Cr/GFR 20/1.24/42.0   27/1.47/34.5   26/1.38/37.1  H&P: Patient was diagnosed with UTI and was discharged on p.o. antibiotics. Patient called back to come back to the ER for positive blood cultures   gram-negative piedad E. coli. Patient also has fever and chills and nausea no   vomiting  1) ecoli bacteremia from UTI  2) sepsis  3) UYEN vs CKD  Treatment: IV Rocephin  IVF  IV Zosyn x1  IV Vancomycin x1  labs  ID consult   ordered  Options provided:  -- Sepsis present as evidenced by, Please document evidence. -- Sepsis was ruled out after study  -- Other - I will add my own diagnosis  -- Disagree - Not applicable / Not valid  -- Disagree - Clinically unable to determine / Unknown  -- Refer to Clinical Documentation Reviewer    PROVIDER RESPONSE TEXT:    Sepsis was ruled out after study.     Query created by: Deyanira Rouse on 3/24/2021 9:56 AM      Electronically signed by:  Bryn Sanders DO 3/24/2021 11:36 AM

## 2021-03-25 ENCOUNTER — APPOINTMENT (OUTPATIENT)
Dept: INTERVENTIONAL RADIOLOGY/VASCULAR | Age: 76
DRG: 690 | End: 2021-03-25
Payer: MEDICARE

## 2021-03-25 LAB
ALBUMIN SERPL-MCNC: 2.9 G/DL (ref 3.5–4.6)
ALP BLD-CCNC: 153 U/L (ref 40–130)
ALT SERPL-CCNC: 11 U/L (ref 0–33)
ANION GAP SERPL CALCULATED.3IONS-SCNC: 10 MEQ/L (ref 9–15)
AST SERPL-CCNC: 16 U/L (ref 0–35)
BASOPHILS ABSOLUTE: 0 K/UL (ref 0–0.2)
BASOPHILS RELATIVE PERCENT: 0.3 %
BILIRUB SERPL-MCNC: 0.6 MG/DL (ref 0.2–0.7)
BUN BLDV-MCNC: 16 MG/DL (ref 8–23)
CALCIUM SERPL-MCNC: 8.8 MG/DL (ref 8.5–9.9)
CHLORIDE BLD-SCNC: 108 MEQ/L (ref 95–107)
CO2: 23 MEQ/L (ref 20–31)
CREAT SERPL-MCNC: 1.04 MG/DL (ref 0.5–0.9)
CULTURE, BLOOD 2: ABNORMAL
EOSINOPHILS ABSOLUTE: 0 K/UL (ref 0–0.7)
EOSINOPHILS RELATIVE PERCENT: 0.4 %
GFR AFRICAN AMERICAN: >60
GFR NON-AFRICAN AMERICAN: 51.5
GLOBULIN: 3 G/DL (ref 2.3–3.5)
GLUCOSE BLD-MCNC: 115 MG/DL (ref 70–99)
HCT VFR BLD CALC: 32.6 % (ref 37–47)
HEMOGLOBIN: 11 G/DL (ref 12–16)
LYMPHOCYTES ABSOLUTE: 0.5 K/UL (ref 1–4.8)
LYMPHOCYTES RELATIVE PERCENT: 7.3 %
MCH RBC QN AUTO: 30.5 PG (ref 27–31.3)
MCHC RBC AUTO-ENTMCNC: 33.7 % (ref 33–37)
MCV RBC AUTO: 90.5 FL (ref 82–100)
MONOCYTES ABSOLUTE: 0.5 K/UL (ref 0.2–0.8)
MONOCYTES RELATIVE PERCENT: 7 %
NEUTROPHILS ABSOLUTE: 6.2 K/UL (ref 1.4–6.5)
NEUTROPHILS RELATIVE PERCENT: 85 %
ORGANISM: ABNORMAL
PDW BLD-RTO: 13.8 % (ref 11.5–14.5)
PLATELET # BLD: 128 K/UL (ref 130–400)
POTASSIUM SERPL-SCNC: 3.3 MEQ/L (ref 3.4–4.9)
RBC # BLD: 3.6 M/UL (ref 4.2–5.4)
SODIUM BLD-SCNC: 141 MEQ/L (ref 135–144)
TOTAL PROTEIN: 5.9 G/DL (ref 6.3–8)
URINE CULTURE, ROUTINE: ABNORMAL
WBC # BLD: 7.3 K/UL (ref 4.8–10.8)

## 2021-03-25 PROCEDURE — 6370000000 HC RX 637 (ALT 250 FOR IP): Performed by: NURSE PRACTITIONER

## 2021-03-25 PROCEDURE — 2060000000 HC ICU INTERMEDIATE R&B

## 2021-03-25 PROCEDURE — 76937 US GUIDE VASCULAR ACCESS: CPT

## 2021-03-25 PROCEDURE — 36415 COLL VENOUS BLD VENIPUNCTURE: CPT

## 2021-03-25 PROCEDURE — 99232 SBSQ HOSP IP/OBS MODERATE 35: CPT | Performed by: INTERNAL MEDICINE

## 2021-03-25 PROCEDURE — 85025 COMPLETE CBC W/AUTO DIFF WBC: CPT

## 2021-03-25 PROCEDURE — 2709999900 IR MIDLINE CATH

## 2021-03-25 PROCEDURE — 05HB33Z INSERTION OF INFUSION DEVICE INTO RIGHT BASILIC VEIN, PERCUTANEOUS APPROACH: ICD-10-PCS | Performed by: INTERNAL MEDICINE

## 2021-03-25 PROCEDURE — 6360000002 HC RX W HCPCS: Performed by: INTERNAL MEDICINE

## 2021-03-25 PROCEDURE — 2580000003 HC RX 258: Performed by: INTERNAL MEDICINE

## 2021-03-25 PROCEDURE — 6370000000 HC RX 637 (ALT 250 FOR IP): Performed by: INTERNAL MEDICINE

## 2021-03-25 PROCEDURE — 36410 VNPNXR 3YR/> PHY/QHP DX/THER: CPT

## 2021-03-25 PROCEDURE — 80053 COMPREHEN METABOLIC PANEL: CPT

## 2021-03-25 RX ORDER — POTASSIUM CHLORIDE 20 MEQ/1
40 TABLET, EXTENDED RELEASE ORAL ONCE
Status: COMPLETED | OUTPATIENT
Start: 2021-03-25 | End: 2021-03-25

## 2021-03-25 RX ADMIN — ONDANSETRON 4 MG: 2 INJECTION INTRAMUSCULAR; INTRAVENOUS at 02:58

## 2021-03-25 RX ADMIN — ENOXAPARIN SODIUM 40 MG: 40 INJECTION SUBCUTANEOUS at 09:39

## 2021-03-25 RX ADMIN — ROSUVASTATIN CALCIUM 40 MG: 40 TABLET, FILM COATED ORAL at 09:38

## 2021-03-25 RX ADMIN — CEFTRIAXONE SODIUM 1000 MG: 1 INJECTION, POWDER, FOR SOLUTION INTRAMUSCULAR; INTRAVENOUS at 20:34

## 2021-03-25 RX ADMIN — SODIUM CHLORIDE, POTASSIUM CHLORIDE, SODIUM LACTATE AND CALCIUM CHLORIDE: 600; 310; 30; 20 INJECTION, SOLUTION INTRAVENOUS at 02:58

## 2021-03-25 RX ADMIN — TRAZODONE HYDROCHLORIDE 50 MG: 50 TABLET ORAL at 20:35

## 2021-03-25 RX ADMIN — LACTOBACILLUS TAB 1 TABLET: TAB at 09:38

## 2021-03-25 RX ADMIN — POTASSIUM CHLORIDE 40 MEQ: 1500 TABLET, EXTENDED RELEASE ORAL at 09:38

## 2021-03-25 RX ADMIN — Medication 2000 UNITS: at 09:38

## 2021-03-25 RX ADMIN — SERTRALINE HYDROCHLORIDE 50 MG: 50 TABLET ORAL at 09:38

## 2021-03-25 NOTE — CARE COORDINATION
IV BENEFIT REQUEST FORM    [facility]                        [address] [place] [facility]    REQUESTED BY: Electronically signed by Nannette Beach RN on 3/25/2021 at 1:54 PM                                               RN/C3: PHONE: [phone]    DATE:/TIME OF REQUEST: 21  TIME: 1:54 PM      TO: [address] [facility]      FAX TO: [phone]    PHONE: [phone]     THIS PATIENT HAS BEEN IDENTIFIED TO POSSIBLY NEED LONG TERM IV'S. PLEASE CHECK INSURANCE COVERAGE FOR THE FOLLOWING PT/DRUGS. PATIENT'S NAME: Roslyn Srinivasan                              ROOM: [facility]/T031-58   PATIENT'S : 1945  PATIENT ADDRESS: [place] [id number]    PAYOR NAME:  Payor: Alvaro Sena / Plan: Donna Tolbert PPO / Product Type: Medicare /     DRUG: ROCEPHIN                     DOSE: 1GRAM          FREQUENCY: Q 24 HR        __________ CHECK HERE IF PT HAS NO INSURANCE AND REQUESTING SELF PAY COST. *IF [facility] IS NOT A PROVIDER FOR THIS PATIENT, PLEASE FORWARD INFO VIA FAX TO [facility] @ [phone],(PHONE NUMBER: [phone]) TO RUN BENEFIT VERIFICATION AND NOTIFY THE ABOVE C3 OF THIS PLAN. (FAX FACE SHEET WITH DEMOGRAPHICS AND INSURANCE INFO WITH THIS FORM.)  PLEASE FAX BENEFIT INFO TO: THE [phone]    This message is intended only for the use of the individual or entity to which it is addressed and may contain information that is privileged, confidential, and exempt from disclosure under applicable law. If the reader of the notice is not intended recipient of the employee/agent responsible for delivering the message to the intended recipient, you are hereby notified than any dissemination, distribution or copying of this communication is strictly prohibited. Please contact the sender for further instructions on handling the information.

## 2021-03-25 NOTE — PROGRESS NOTES
Physician Progress Note      PATIENT:               Rasheed Keys  CSN #:                  [id number]  :                       1945  ADMIT DATE:       3/23/2021 8:20 PM  DISCH DATE:  RESPONDING  PROVIDER #:        Burak HOLM DO          QUERY TEXT:    Patient admitted with E coli bacteremia from UTI and UYEN vs CKD. Noted   documentation of sepsis in H&P which was ruled out and renal insufficiency   versus CKD was documented. Subsequently, Dr. Ale Aguirre documented E. coli UTI with   sepsis and acute kidney injury on 3/24/21. If possible, please document in   progress notes and discharge summary:    The medical record reflects the following:  Risk Factors: age 67  HTN  HLD  Clinical Indicators: 3/24/2021  7:38 AM  Culture, Blood 2 Escherichia coli DNA   Detected   no urine culture thus far  WBC 13.9  13.6   BUN/Cr/GFR 20/1.24/42.0   27/1.47/34.5   26/1.38/37.1     16/1.04/51.5  H&P: Patient was diagnosed with UTI and was discharged on p.o. antibiotics. Patient called back to come back to the ER for positive blood cultures   gram-negative piedad E. coli. Patient also has fever and chills and nausea no   vomiting  1) ecoli bacteremia from UTI  2) sepsis  3) UYEN vs CKD  3/24 Dr. Pia Reagan: Gram-negative bacteremia  UTI present on admission    Generalized weakness due to above  Renal insufficiency versus CKD  Sepsis was   ruled out  3/24 Dr. Ale Aguirre:  does not feel well at all with persistent generalized body   aches and chills. Poor appetite and generalized weakness and fatigue x 2   weeks. Patient reports previous history of UTIs.   E. coli UTI with sepsis    Acute kidney injury  Treatment: IV Rocephin  IVF  IV Zosyn x1  IV Vancomycin x1  labs  ID  Options provided:  -- Sepsis ruled out  -- Sepsis confirmed present on admission  -- Defer to Dr. Ale Aguirre consultant documentation regarding sepsis  -- Other - I will add my own diagnosis  -- Disagree - Not applicable / Not valid  -- Disagree - Clinically unable to determine / Unknown  -- Refer to Clinical Documentation Reviewer    PROVIDER RESPONSE TEXT:    The diagnosis of sepsis was ruled out.     Query created by: Mary Curiel on 3/25/2021 7:36 AM      Electronically signed by:  America Tejeda DO 3/25/2021 9:43 AM

## 2021-03-25 NOTE — CARE COORDINATION
MET WITH PATIENT, DISCUSSED IV'S--[facility]. PT FOR MIDLINE. PT WOULD LIKE TO COME TO [facility] DAILY IN THE AFTERNOON IF ABLE. PT AWARE SHE WILL NEED TO CALL HUB/SUPERVISOR ON THE WEEKENDS FOR TIME AND PLACE. CALLED [facility] AND SPOKE TO SYLVIA. WILL NEED TO CALL AND REGISTER ONCE RX AVAILABLE. WILL ALSO NEED TO FAX RX TO [phone]. MARY SCHRADER, ROCEPHIN TO MOVED UP ON TIMING(CURRENTLY MIDNIGHT).

## 2021-03-25 NOTE — PROGRESS NOTES
Infectious Diseases Inpatient Progress Note          HISTORY OF PRESENT ILLNESS:  Follow up E. coli sepsis secondary to UTI on IV Rocephin, well tolerated. Patient has persistent nausea, poor appetite, generalized weakness. Polyarthralgias that are chronic. Low-grade fevers and tachycardia this morning. E. coli with resistance to multiple oral antibiotics . Current Medications:     cefTRIAXone (ROCEPHIN) IV  1,000 mg Intravenous Q24H    enoxaparin  40 mg Subcutaneous Daily    Vitamin D  2,000 Units Oral Daily    lactobacillus acidophilus  1 tablet Oral Daily    rosuvastatin  40 mg Oral Daily    sertraline  50 mg Oral Daily    traZODone  50 mg Oral Nightly       Allergies:  Aspirin, Atorvastatin calcium, Ezetimibe-simvastatin, and Pravastatin sodium      Review of Systems  14 system review is negative other than HPI    Physical Exam  Vitals:    03/24/21 1435 03/24/21 2019 03/25/21 0721 03/25/21 0800   BP: (!) 108/48 (!) 123/52 (!) 121/48    Pulse: 89 95 102 100   Resp:  16 17    Temp: 98.2 °F (36.8 °C) 98.6 °F (37 °C) 99 °F (37.2 °C)    TempSrc:  Oral Oral    SpO2: 90% 93% 90%    Weight:       Height:         General Appearance: alert and oriented to person, place and time, well-developed and well-nourished, in no acute distress  Skin: warm and dry, no rash. Head: normocephalic and atraumatic  Eyes: anicteric sclerae  ENT: oropharynx clear and moist with normal mucous membranes.  No oral thrush  Lungs: normal respiratory effort, clear lungs  Heart normal S1-S2 no murmur  Abdomen: soft, no tenderness  No leg edema  No erythema, no tenderness      DATA:    Lab Results   Component Value Date    WBC 7.3 03/25/2021    HGB 11.0 (L) 03/25/2021    HCT 32.6 (L) 03/25/2021    MCV 90.5 03/25/2021     (L) 03/25/2021     Lab Results   Component Value Date    CREATININE 1.04 (H) 03/25/2021    BUN 16 03/25/2021     03/25/2021    K 3.3 (L) 03/25/2021     (H) 03/25/2021    CO2 23 03/25/2021 Hepatic Function Panel:  Lab Results   Component Value Date    ALKPHOS 153 03/25/2021    ALT 11 03/25/2021    AST 16 03/25/2021    PROT 5.9 03/25/2021    BILITOT 0.6 03/25/2021    LABALBU 2.9 03/25/2021       Microbiology:   Recent Labs     03/22/21  2358   BC Gram stain anaerobic bottle  Gram negative rods  2 out of 2 blood cultures  Further results to follow  *  POSITIVE for  ID and sensitivity to follow       Recent Labs     03/22/21  2358   BLOODCULT2 2 out of 2 blood cultures*  The following organisms and resistance markers have been  tested using nucleic acid testing technology. ORGANISMS:  Escherica coli, Klebisella pneumonia, Klebsiella oxytoca,  Pseudomonas aeruginosa, Enterobacter species, Proteus species,  Citrobacter species, Acinetobacter species. CARBAPENEM (CRE) RESISTANCE MARKERS:  kpc, imp, ndm, vim, oxa  ESBL RESISTANCE MARKER:  ctx-m    POSITIVE for     Recent Labs     03/22/21  2030   LABURIN >100,000 CFU/ml     No results for input(s): WNDABS in the last 72 hours. No results for input(s): CULTRESP in the last 72 hours. Susceptibility    Escherichia coli (2)    Antibiotic Interpretation RAUDEL Status   amoxicillin-clavulanate Resistant >=32 mcg/mL    ampicillin Resistant >=32 mcg/mL    ceFAZolin Sensitive <=4 mcg/mL    cefepime Sensitive <=1 mcg/mL    ciprofloxacin Resistant >=4 mcg/mL    gentamicin Resistant >=16 mcg/mL    imipenem Sensitive <=0.25 mcg/mL    levofloxacin Resistant >=8 mcg/mL    tobramycin Intermediate 8 mcg/mL    trimethoprim-sulfamethoxazole Resistant >=320 mcg          Imaging:       Impression   No evidence for hydronephrosis. Small echogenic foci are seen in both kidneys that could represent vascular calcifications are possible stones. IMPRESSION:    · Sepsis secondary to E. coli UTI    Patient Active Problem List   Diagnosis    Bacteremia       PLAN:  · Midline in a.m.   · Arrange for discharge on IV Rocephin for 10 days  · Follow-up CBC BMP and clinically    Discussed with patient and     Ehsan Garcia MD

## 2021-03-25 NOTE — PROGRESS NOTES
Physician Progress Note    3/25/2021   9:43 AM    Name:  Migel Reyes  MRN:    [id number]     IP Day: 2     Admit Date: 3/23/2021  8:20 PM  PCP: Ted Lazo MD    Code Status:  Full Code      Assessment and Plan: Active Problems/ diagnosis:     Gram-negative bacteremia  E. coli UTI present on admission  Generalized weakness due to above  UYEN-improved  Sepsis was ruled out  Hypertension  Hyperlipidemia  CRISTEL  History of depression  DJD/osteoporosis/osteoarthritis    Plan  3/24/21-  -patient is afebrile and hemodynamically stable with IV Rocephin. Follow-up repeat blood cultures  -Infectious disease on board  -Resume home medication  -as needed Ultram for pain  -Recheck a.m. labs  -Anticipate discharge home once okay by infectious disease and once her blood cultures are negative    DVT PPx     Subjective:     Weakness is improving today. No dyspnea or abdominal pain.     Physical Examination:      Vitals:  BP (!) 121/48   Pulse 102   Temp 99 °F (37.2 °C) (Oral)   Resp 17   Ht 4' 10\" (1.473 m)   Wt 132 lb 11.5 oz (60.2 kg)   SpO2 90%   BMI 27.74 kg/m²   Temp (24hrs), Av.6 °F (37 °C), Min:98.2 °F (36.8 °C), Max:99 °F (37.2 °C)      General appearance: alert, cooperative and no distress  Mental Status: oriented to person, place and time and normal affect  Lungs: clear to auscultation bilaterally, normal effort  Heart: regular rate and rhythm, no murmur  Abdomen: soft, nontender, nondistended, bowel sounds present, no masses  Extremities: no edema, redness, tenderness in the calves  Skin: no gross lesions, rashes  Neurologically: Alert to x3, nonfocal    Data:     Labs:  Recent Labs     21  0607 21  0602   WBC 13.6* 7.3   HGB 11.8* 11.0*    128*     Recent Labs     21  0607 21  0602    141   K 3.4 3.3*   CL 99 108*   CO2 21 23   BUN 26* 16   CREATININE 1.38* 1.04*   GLUCOSE 111* 115*     Recent Labs     21  0607 21  0602   AST 20 16   ALT 14 11 BILITOT 1.0* 0.6   ALKPHOS 149* 153*       Current Facility-Administered Medications   Medication Dose Route Frequency Provider Last Rate Last Admin    traMADol (ULTRAM) tablet 50 mg  50 mg Oral Q6H PRN Nithyabhaskar Durantin, DO   50 mg at 03/24/21 1140    cefTRIAXone (ROCEPHIN) 1000 mg IVPB in 50 mL D5W minibag  1,000 mg Intravenous Q24H Sunita Cristina MD   Stopped at 03/25/21 0010    enoxaparin (LOVENOX) injection 40 mg  40 mg Subcutaneous Daily Sunita Cristina MD   40 mg at 03/25/21 0939    ondansetron (ZOFRAN) injection 4 mg  4 mg Intravenous Q6H PRN Sunita Cristina MD   4 mg at 03/25/21 0258    acetaminophen (TYLENOL) tablet 650 mg  650 mg Oral Q4H PRN Sunita Cristina MD   650 mg at 03/24/21 1649    Vitamin D (CHOLECALCIFEROL) tablet 2,000 Units  2,000 Units Oral Daily Paddy Wray RN, NP   2,000 Units at 03/25/21 0938    lactobacillus acidophilus [facility]) 1 tablet  1 tablet Oral Daily Paddy Wray RN, NP   1 tablet at 03/25/21 0938    rosuvastatin (CRESTOR) tablet 40 mg  40 mg Oral Daily Paddy Wray RN, NP   40 mg at 03/25/21 0938    sertraline (ZOLOFT) tablet 50 mg  50 mg Oral Daily Paddy Wray RN, NP   50 mg at 03/25/21 9260    traZODone (DESYREL) tablet 50 mg  50 mg Oral Nightly Paddy Wray RN, NP   50 mg at 03/24/21 2020    ALPRAZolam (XANAX) tablet 0.5 mg  0.5 mg Oral Nightly PRN Paddy Wray RN, NP   0.5 mg at 03/23/21 2331       Additional work up or/and treatment plan may be added today or then after based on clinical progression. I am managing a portion of pt care. Some medical issues are handled by other specialists. Additional work up and treatment should be done in out pt setting by pt PCP and other out pt providers. In addition to examining and evaluating pt, I spent additional time explaining care, normaland abnormal findings, and treatment plan. All of pt questions were answered.  Counseling, diet and education were

## 2021-03-26 VITALS
SYSTOLIC BLOOD PRESSURE: 131 MMHG | RESPIRATION RATE: 18 BRPM | HEART RATE: 100 BPM | WEIGHT: 132.72 LBS | HEIGHT: 58 IN | OXYGEN SATURATION: 95 % | BODY MASS INDEX: 27.86 KG/M2 | DIASTOLIC BLOOD PRESSURE: 77 MMHG | TEMPERATURE: 98.2 F

## 2021-03-26 LAB
ALBUMIN SERPL-MCNC: 3.1 G/DL (ref 3.5–4.6)
ALP BLD-CCNC: 166 U/L (ref 40–130)
ALT SERPL-CCNC: 11 U/L (ref 0–33)
ANION GAP SERPL CALCULATED.3IONS-SCNC: 14 MEQ/L (ref 9–15)
AST SERPL-CCNC: 19 U/L (ref 0–35)
BASOPHILS ABSOLUTE: 0 K/UL (ref 0–0.2)
BASOPHILS RELATIVE PERCENT: 0.4 %
BILIRUB SERPL-MCNC: 0.5 MG/DL (ref 0.2–0.7)
BUN BLDV-MCNC: 13 MG/DL (ref 8–23)
CALCIUM SERPL-MCNC: 8.6 MG/DL (ref 8.5–9.9)
CHLORIDE BLD-SCNC: 105 MEQ/L (ref 95–107)
CO2: 22 MEQ/L (ref 20–31)
CREAT SERPL-MCNC: 1 MG/DL (ref 0.5–0.9)
EOSINOPHILS ABSOLUTE: 0.1 K/UL (ref 0–0.7)
EOSINOPHILS RELATIVE PERCENT: 0.8 %
GFR AFRICAN AMERICAN: >60
GFR NON-AFRICAN AMERICAN: 53.9
GLOBULIN: 3.3 G/DL (ref 2.3–3.5)
GLUCOSE BLD-MCNC: 104 MG/DL (ref 70–99)
HCT VFR BLD CALC: 35.5 % (ref 37–47)
HEMOGLOBIN: 11.9 G/DL (ref 12–16)
LYMPHOCYTES ABSOLUTE: 1.2 K/UL (ref 1–4.8)
LYMPHOCYTES RELATIVE PERCENT: 14.7 %
MCH RBC QN AUTO: 30.2 PG (ref 27–31.3)
MCHC RBC AUTO-ENTMCNC: 33.6 % (ref 33–37)
MCV RBC AUTO: 89.7 FL (ref 82–100)
MONOCYTES ABSOLUTE: 1 K/UL (ref 0.2–0.8)
MONOCYTES RELATIVE PERCENT: 11.6 %
NEUTROPHILS ABSOLUTE: 6 K/UL (ref 1.4–6.5)
NEUTROPHILS RELATIVE PERCENT: 72.5 %
PDW BLD-RTO: 13.8 % (ref 11.5–14.5)
PLATELET # BLD: 156 K/UL (ref 130–400)
POTASSIUM SERPL-SCNC: 3.3 MEQ/L (ref 3.4–4.9)
RBC # BLD: 3.96 M/UL (ref 4.2–5.4)
SODIUM BLD-SCNC: 141 MEQ/L (ref 135–144)
TOTAL PROTEIN: 6.4 G/DL (ref 6.3–8)
WBC # BLD: 8.3 K/UL (ref 4.8–10.8)

## 2021-03-26 PROCEDURE — 6360000002 HC RX W HCPCS: Performed by: INTERNAL MEDICINE

## 2021-03-26 PROCEDURE — 99232 SBSQ HOSP IP/OBS MODERATE 35: CPT | Performed by: INTERNAL MEDICINE

## 2021-03-26 PROCEDURE — 80053 COMPREHEN METABOLIC PANEL: CPT

## 2021-03-26 PROCEDURE — 6370000000 HC RX 637 (ALT 250 FOR IP): Performed by: NURSE PRACTITIONER

## 2021-03-26 PROCEDURE — 2580000003 HC RX 258: Performed by: INTERNAL MEDICINE

## 2021-03-26 PROCEDURE — 85025 COMPLETE CBC W/AUTO DIFF WBC: CPT

## 2021-03-26 PROCEDURE — 36415 COLL VENOUS BLD VENIPUNCTURE: CPT

## 2021-03-26 PROCEDURE — 6370000000 HC RX 637 (ALT 250 FOR IP): Performed by: INTERNAL MEDICINE

## 2021-03-26 RX ADMIN — POTASSIUM BICARBONATE 40 MEQ: 782 TABLET, EFFERVESCENT ORAL at 10:06

## 2021-03-26 RX ADMIN — SERTRALINE HYDROCHLORIDE 50 MG: 50 TABLET ORAL at 09:59

## 2021-03-26 RX ADMIN — CEFTRIAXONE SODIUM 1000 MG: 1 INJECTION, POWDER, FOR SOLUTION INTRAMUSCULAR; INTRAVENOUS at 15:43

## 2021-03-26 RX ADMIN — Medication 2000 UNITS: at 10:05

## 2021-03-26 RX ADMIN — LACTOBACILLUS TAB 1 TABLET: TAB at 09:59

## 2021-03-26 RX ADMIN — ROSUVASTATIN CALCIUM 40 MG: 40 TABLET, FILM COATED ORAL at 09:59

## 2021-03-26 NOTE — PROGRESS NOTES
Infectious Diseases Inpatient Progress Note          HISTORY OF PRESENT ILLNESS:  Follow up E. coli sepsis secondary to UTI on IV Rocephin, well tolerated. Patient feels much better today with resolved nausea, improved appetite and decreased generalized weakness. Decreased polyarthralgias that are chronic. No fevers. E. coli with resistance to multiple oral antibiotics . Current Medications:     cefTRIAXone (ROCEPHIN) IV  1,000 mg Intravenous Q24H    enoxaparin  40 mg Subcutaneous Daily    Vitamin D  2,000 Units Oral Daily    lactobacillus acidophilus  1 tablet Oral Daily    rosuvastatin  40 mg Oral Daily    sertraline  50 mg Oral Daily    traZODone  50 mg Oral Nightly       Allergies:  Aspirin, Atorvastatin calcium, Ezetimibe-simvastatin, and Pravastatin sodium      Review of Systems  14 system review is negative other than HPI    Physical Exam  Vitals:    03/25/21 1902 03/25/21 2035 03/26/21 0803 03/26/21 0808   BP: (!) 147/55  131/77    Pulse: 101  95 100   Resp: 19  18    Temp: 99.7 °F (37.6 °C) 99.3 °F (37.4 °C) 98.2 °F (36.8 °C)    TempSrc: Oral  Oral    SpO2: 90%  90% 95%   Weight:       Height:         General Appearance: alert and oriented to person, place and time, well-developed and well-nourished, in no acute distress  Skin: warm and dry, no rash. Head: normocephalic and atraumatic  Eyes: anicteric sclerae  ENT: oropharynx clear and moist with normal mucous membranes.  No oral thrush  Lungs: normal respiratory effort, clear lungs  Heart normal S1-S2 no murmur  Abdomen: soft, no tenderness  No leg edema  No erythema, no tenderness  Intact right upper extremity midline    DATA:    Lab Results   Component Value Date    WBC 8.3 03/26/2021    HGB 11.9 (L) 03/26/2021    HCT 35.5 (L) 03/26/2021    MCV 89.7 03/26/2021     03/26/2021     Lab Results   Component Value Date    CREATININE 1.00 (H) 03/26/2021    BUN 13 03/26/2021     03/26/2021    K 3.3 (L) 03/26/2021     03/26/2021 CO2 22 03/26/2021       Hepatic Function Panel:  Lab Results   Component Value Date    ALKPHOS 166 03/26/2021    ALT 11 03/26/2021    AST 19 03/26/2021    PROT 6.4 03/26/2021    BILITOT 0.5 03/26/2021    LABALBU 3.1 03/26/2021       Microbiology:   Recent Labs     03/24/21  1124   BC No Growth to date. Any change in status will be called. Recent Labs     03/24/21  1124   BLOODCULT2 No Growth to date. Any change in status will be called. No results for input(s): LABURIN in the last 72 hours. No results for input(s): WNDABS in the last 72 hours. No results for input(s): CULTRESP in the last 72 hours. Susceptibility    Escherichia coli (2)    Antibiotic Interpretation RAUDEL Status   amoxicillin-clavulanate Resistant >=32 mcg/mL    ampicillin Resistant >=32 mcg/mL    ceFAZolin Sensitive <=4 mcg/mL    cefepime Sensitive <=1 mcg/mL    ciprofloxacin Resistant >=4 mcg/mL    gentamicin Resistant >=16 mcg/mL    imipenem Sensitive <=0.25 mcg/mL    levofloxacin Resistant >=8 mcg/mL    tobramycin Intermediate 8 mcg/mL    trimethoprim-sulfamethoxazole Resistant >=320 mcg          Imaging:       Impression   No evidence for hydronephrosis. Small echogenic foci are seen in both kidneys that could represent vascular calcifications are possible stones.            IMPRESSION:    · Sepsis secondary to E. coli UTI    Patient Active Problem List   Diagnosis    Bacteremia       PLAN:  · May discharge on IV Rocephin for 10 days  · Follow-up CBC BMP and CRP  · Follow-up in 10 days    Discussed with patient and RN    Marcelina Pollack MD

## 2021-03-26 NOTE — DISCHARGE SUMMARY
or edema bilaterally. Full range of motion without deformity. Skin: Skin color, texture, turgor normal.  No rashes or lesions. Neuro: Non Focal. Symetrical motor and tone. Nl Comprehension, Alert,awake and oriented. NL CN. Symetrical tone and reflexes. Psychiatric: Alert and oriented, thought content appropriate, normal insight  Capillary Refill: Brisk,< 3 seconds   Peripheral Pulses: +2 palpable, equal bilaterally     Patient was seen by the following consultants while admitted to [facility]:   Consults:  IP CONSULT TO INFECTIOUS DISEASES    Significant Diagnostic Studies:    Refer to chart     Please refer to chart if no studies are shown here    Us Retroperitoneal Limited    Result Date: 3/24/2021  COMPARISON: June 2, 2005 HISTORY:  UYEN COMMENTS: R78.81 Bacteremia ICD10 TECHNIQUE: US RETROPERITONEAL LIMITED FINDINGS: The right kidney measures 10.6 x 4.6 x 4.6 cm. The left kidney measures 10.7 x 4.3 x 4.5 cm. The corticomedullary junctions are preserved. There are small echogenic foci seen in both kidneys. They're nonobstructing. These could represent nephroliths or vascular calcifications. No perinephric fluid or hydronephrosis is seen. No evidence for hydronephrosis. Small echogenic foci are seen in both kidneys that could represent vascular calcifications are possible stones. Discharge Medications: Mihaela Jaramillo [facility]   Home Medication Instructions XYU:[id number]    Printed on:03/26/21 9452   Medication Information                      ALPRAZolam (XANAX) 0.5 MG tablet  Take 0.5 mg by mouth 2 times daily as needed for Sleep or Anxiety.              Cholecalciferol 50 MCG (2000 UT) TABS  Take 2,000 Units by mouth daily             ibuprofen (ADVIL) 200 MG tablet  Take 600 mg by mouth every 6 hours as needed for Pain             indapamide (LOZOL) 1.25 MG tablet  Take 1.25 mg by mouth every morning             ondansetron (ZOFRAN) 4 MG tablet  Take 1 tablet by mouth every 8 hours as needed for Nausea             Probiotic Product (PROBIOTIC ADVANCED PO)  Take 1 tablet by mouth             rosuvastatin (CRESTOR) 40 MG tablet  Take 40 mg by mouth daily             sertraline (ZOLOFT) 50 MG tablet  Take 50 mg by mouth daily             traZODone (DESYREL) 50 MG tablet  Take 50 mg by mouth nightly                 Disposition:   If discharged to Home, Any [place] [address] needs that were indicated and/or required as been addressed and set up by Social Work. Condition at discharge: good     Activity: activity as tolerated    Total time taken for discharging this patient: 40 minutes. Greater than 70% of time was spent focused exclusively on this patient. Time was taken to review chart, discuss plans with consultants, reconciling medications, discussing plan answering questions with patient.      Isabella Dhaliwal  3/26/2021, 12:56 PM  ----------------------------------------------------------------------------------------------------------------------    Karolina Cain

## 2021-03-26 NOTE — CARE COORDINATION
ATTEMPTED TO CALL TO REGISTER/SCHEDULE PATIENT FOR OP INFUSION ROCEPHIN STARTING Monday AT NOON, PT IS AWARE SHE WILL NEED TO CALL SUPERVISOR ON THE WEEKEND FOR ROOM NUMBER. WAS ON HOLD FOR OVER 30 MIN, CALLED AGAIN AND SEC. WAS ABLE TO SEND AN EMAIL TO ONE OF THE WORKERS TO CALL ME BACK ONCE AVAILABLE.  Kesha Daniel [facility] NOTIFIED. SYLVIA AT OP INFUSION UPDATED. STILL AWAITING RX, NOTE LEFT. [address], ONCE PTS DEDUCTIBLE IS MET(HAS $204 LEFT), THEN IV'S WILL % COVERED WILL ONLY A $5/WK COPAY. [facility]. SECOND IMM REVIEWED, PT VERBALIZED UNDERSTANDING.      1400  PT REGISTERED FOR OP INFUSION. INFO FAXED TO HUB, OP INFUSION.

## 2021-03-26 NOTE — PROGRESS NOTES
1500 Assumed care of pt from Hilaria Flores[address]  Reviewed discharge instructions with pt and answered all questions at this time. Pt denies further needs. Call placed for transport.

## 2021-03-27 ENCOUNTER — HOSPITAL ENCOUNTER (OUTPATIENT)
Dept: INFUSION THERAPY | Age: 76
Setting detail: INFUSION SERIES
Discharge: HOME | DRG: 690 | End: 2021-03-27
Payer: MEDICARE

## 2021-03-27 ENCOUNTER — CARE COORDINATION (OUTPATIENT)
Dept: CASE MANAGEMENT | Age: 76
End: 2021-03-27

## 2021-03-27 VITALS
RESPIRATION RATE: 16 BRPM | TEMPERATURE: 98.1 F | DIASTOLIC BLOOD PRESSURE: 48 MMHG | HEART RATE: 87 BPM | OXYGEN SATURATION: 97 % | SYSTOLIC BLOOD PRESSURE: 132 MMHG

## 2021-03-27 DIAGNOSIS — R78.81 BACTEREMIA: Primary | ICD-10-CM

## 2021-03-27 PROCEDURE — 6360000002 HC RX W HCPCS: Performed by: INTERNAL MEDICINE

## 2021-03-27 PROCEDURE — 96365 THER/PROPH/DIAG IV INF INIT: CPT

## 2021-03-27 PROCEDURE — 2580000003 HC RX 258: Performed by: INTERNAL MEDICINE

## 2021-03-27 RX ADMIN — CEFTRIAXONE SODIUM 1000 MG: 1 INJECTION, POWDER, FOR SOLUTION INTRAMUSCULAR; INTRAVENOUS at 12:31

## 2021-03-27 NOTE — CARE COORDINATION
[facility] Transitions Initial Follow Up Call    Call within 2 business days of discharge: Yes    Patient: Bill Page Patient : 1945   MRN: <[id number]>  Reason for Admission: bacteremia  Discharge Date: 3/26/21 RARS: Readmission Risk Score: 7      Last Discharge [facility]       Complaint Diagnosis Description Type Department Provider    3/23/21 Other Bacteremia . .. ED to Hosp-Admission (Discharged) (ADMITTED) [facility]; Christy Green. ..            # 1 attempt-unable to reach patient, left vm message with name and call back information, requested call back//JU    Facility: [facility]    Non-face-to-face services provided:      Care Transitions 24 Hour Call    Care Transitions Interventions         Follow Up  Future Appointments   Date Time Provider Venus Wong   3/28/2021 12:00 PM  [facility]   3/29/2021 12:00 PM [facility] INFUSION CHAIR [facility]       Agustin Trinidad RN

## 2021-03-27 NOTE — FLOWSHEET NOTE
1205- Patient arrived to the floor ambulatory for IV antibiotics. Alert and oriented x 4. Sent to room 177. Orders faxed to pharmacy. 1235- Vital signs stable. Right Picc line flushes with a blood return. IV Rocephin now infusing. Call light in reach. Electronically signed by Joey Yepez on 3/27/2021 at 12:36 PM   1308- IV Rocephin completed. Student nurse will wheel patient out to car. Electronically signed by Joey Yepez on 3/27/2021 at 1:09 PM

## 2021-03-28 ENCOUNTER — HOSPITAL ENCOUNTER (OUTPATIENT)
Dept: INFUSION THERAPY | Age: 76
Setting detail: INFUSION SERIES
Discharge: HOME | End: 2021-03-28
Payer: MEDICARE

## 2021-03-28 VITALS
RESPIRATION RATE: 18 BRPM | OXYGEN SATURATION: 97 % | DIASTOLIC BLOOD PRESSURE: 54 MMHG | HEART RATE: 88 BPM | SYSTOLIC BLOOD PRESSURE: 130 MMHG | TEMPERATURE: 98.2 F

## 2021-03-28 DIAGNOSIS — R78.81 BACTEREMIA: Primary | ICD-10-CM

## 2021-03-28 LAB
BLOOD CULTURE, ROUTINE: ABNORMAL
BLOOD CULTURE, ROUTINE: ABNORMAL
ORGANISM: ABNORMAL

## 2021-03-28 PROCEDURE — 6360000002 HC RX W HCPCS: Performed by: INTERNAL MEDICINE

## 2021-03-28 PROCEDURE — 96365 THER/PROPH/DIAG IV INF INIT: CPT

## 2021-03-28 PROCEDURE — 2580000003 HC RX 258: Performed by: INTERNAL MEDICINE

## 2021-03-28 RX ADMIN — CEFTRIAXONE SODIUM 1000 MG: 1 INJECTION, POWDER, FOR SOLUTION INTRAMUSCULAR; INTRAVENOUS at 12:29

## 2021-03-28 NOTE — FLOWSHEET NOTE
Patient arrived to floor via wheelchair with  to get IV infusion. Alert and oriented x 4. Vital signs stable. Orders already faxed to pharmacy, waiting for IV rocephin to be sent up. Call bell in reach. Electronically signed by Nita Jack on 3/28/2021 at 12:15 PM   1303- IV Rocephin complete. Patient leaving in wheelchair with . Electronically signed by Nita Jack on 3/28/2021 at 1:04 PM

## 2021-03-29 ENCOUNTER — HOSPITAL ENCOUNTER (OUTPATIENT)
Dept: INFUSION THERAPY | Age: 76
Setting detail: INFUSION SERIES
Discharge: HOME | End: 2021-03-29
Payer: MEDICARE

## 2021-03-29 VITALS
RESPIRATION RATE: 18 BRPM | DIASTOLIC BLOOD PRESSURE: 78 MMHG | TEMPERATURE: 98.7 F | SYSTOLIC BLOOD PRESSURE: 123 MMHG | HEART RATE: 78 BPM

## 2021-03-29 DIAGNOSIS — R78.81 BACTEREMIA: Primary | ICD-10-CM

## 2021-03-29 LAB
BLOOD CULTURE, ROUTINE: NORMAL
CULTURE, BLOOD 2: NORMAL

## 2021-03-29 PROCEDURE — 2580000003 HC RX 258: Performed by: INTERNAL MEDICINE

## 2021-03-29 PROCEDURE — 96365 THER/PROPH/DIAG IV INF INIT: CPT

## 2021-03-29 PROCEDURE — 6360000002 HC RX W HCPCS: Performed by: INTERNAL MEDICINE

## 2021-03-29 RX ADMIN — CEFTRIAXONE SODIUM 1000 MG: 1 INJECTION, POWDER, FOR SOLUTION INTRAMUSCULAR; INTRAVENOUS at 12:00

## 2021-03-29 NOTE — FLOWSHEET NOTE
Infusion complete, patient tolerated well. Left unit via wheelchair with spouse assistance, all equipment cleaned after discharge.

## 2021-03-30 ENCOUNTER — HOSPITAL ENCOUNTER (OUTPATIENT)
Dept: INFUSION THERAPY | Age: 76
Setting detail: INFUSION SERIES
Discharge: HOME | End: 2021-03-30
Payer: MEDICARE

## 2021-03-30 ENCOUNTER — CARE COORDINATION (OUTPATIENT)
Dept: CASE MANAGEMENT | Age: 76
End: 2021-03-30

## 2021-03-30 VITALS
SYSTOLIC BLOOD PRESSURE: 116 MMHG | HEART RATE: 83 BPM | RESPIRATION RATE: 18 BRPM | TEMPERATURE: 98.6 F | DIASTOLIC BLOOD PRESSURE: 69 MMHG

## 2021-03-30 DIAGNOSIS — R78.81 BACTEREMIA: Primary | ICD-10-CM

## 2021-03-30 PROCEDURE — 6360000002 HC RX W HCPCS: Performed by: INTERNAL MEDICINE

## 2021-03-30 PROCEDURE — 2580000003 HC RX 258: Performed by: INTERNAL MEDICINE

## 2021-03-30 PROCEDURE — 96365 THER/PROPH/DIAG IV INF INIT: CPT

## 2021-03-30 RX ADMIN — CEFTRIAXONE SODIUM 1000 MG: 1 INJECTION, POWDER, FOR SOLUTION INTRAMUSCULAR; INTRAVENOUS at 11:51

## 2021-03-30 NOTE — CARE COORDINATION
[facility] Transitions Initial Follow Up Call    Call within 2 business days of discharge: Yes    Patient: Ramiro Kiran Patient : 1945   MRN: <[id number]>  Reason for Admission: Urinary tract infection- with hematuria- Sepsis  Discharge Date: 3/26/21 RARS: Readmission Risk Score: 7      Last Discharge [facility]       Complaint Diagnosis Description Type Department Provider    3/23/21 Other Bacteremia . .. ED to Hosp-Admission (Discharged) (ADMITTED) [address], ; Ollie Mclaughlin. .. Spoke with: Sherylmaycol Fuller. Sheryl Fuller states she is feeling fine except very tired. Pt. States she got little sleep in the hospital. Pt. States she has no appetite. Voiding without pain or difficulty. Pt. Denies hematuria. Bowels moving WNL. Sheryl Fuller goes to the infusion center daily for IV antibiotics due to infection. Pt. States she has IV access line in her upper right arm. IV treatment scheduled for 10 days. Reviewed all other medications. Pt is taking as directed. (1111F not completed. Pt. Has a non [facility] PCP) No new issues or concerns . Denies need for [facility] or [facility]. Facility: [address] [place] [address]     Non-face-to-face services provided:  Obtained and reviewed discharge summary and/or continuity of care documents     Was this an external facility discharge? No Discharge Facility: n/a    Challenges to be reviewed by the provider   Additional needs identified to be addressed with provider No  none             Method of communication with provider : none    Discussed COVID-19 related testing which was available at this time. Test results were negative. Patient informed of results, if available? Yes    Advance Care Planning:   Does patient have an Advance Directive:  reviewed and current. Was this a readmission?  No  Patient stated reason for admission: UTI  Sepsis  Patients top risk factors for readmission: medical condition    Care Transition Nurse (CTN) contacted the patient by telephone to perform post hospital discharge assessment. Verified name and  with patient as identifiers. Provided introduction to self, and explanation of the CTN role. CTN reviewed discharge instructions, medical action plan and red flags with patient who verbalized understanding. Patient given an opportunity to ask questions and does not have any further questions or concerns at this time. Were discharge instructions available to patient? Yes. Reviewed appropriate site of care based on symptoms and resources available to patient including: PCP and When to call 911. The patient agrees to contact the PCP office for questions related to their healthcare. Medication reconciliation was performed with patient, who verbalizes understanding of administration of home medications. Advised obtaining a 90-day supply of all daily and as-needed medications. Covid Risk Education    Patient has following risk factors of: sepsis. Education provided regarding infection prevention, and signs and symptoms of COVID-19 and when to seek medical attention with patient who verbalized understanding. Discussed exposure protocols and quarantine From CDC: Are you at higher risk for severe illness?   and given an opportunity for questions and concerns. The patient agrees to contact the COVID-19 hotline [phone] or PCP office for questions related to COVID-19. For more information on steps you can take to protect yourself, see CDC's How to Protect Yourself     Was patient discharged with a pulse oximeter? No Discussed and confirmed pulse oximeter discharge instructions and when to notify provider or seek emergency care. Patient/family/caregiver given information for [employer] and agrees to enroll no  Patient's preferred e-mail: declines  Patient's preferred phone number: declines    Discussed follow-up appointments. If no appointment was previously scheduled, appointment scheduling offered: Yes.  Is follow up appointment scheduled within 7 days of discharge? Yes  Non-[facility] follow up appointment(s): Dr. Juan Reynolds will call patient in 1 week to schedule      Plan for follow-up call in 5-7 days based on severity of symptoms and risk factors. Plan for next call: symptom management-hematuria  UTI and medication management-IV Antibiotics  CTN provided contact information for future needs.         Care Transitions 24 Hour Call    Do you have any ongoing symptoms?: No  Do you have a copy of your discharge instructions?: Yes  Do you have all of your prescriptions and are they filled?: Yes  Have you been contacted by a [facility] Pharmacist?: Yes  Care Transitions Interventions         Follow Up  Future Appointments   Date Time Provider Venus Wong   3/31/2021 12:00 PM [address] [facility] [address] [place] [address]   4/1/2021 12:00 PM [address]   4/2/2021 12:00 PM [address] [facility] [address] [place] [address]   4/3/2021 12:00 PM [address] [facility]   4/4/2021 12:00 PM [address]   4/5/2021 12:00 PM [address]60 [facility], [address] [facility] Transitions Nurse   [phone]

## 2021-03-31 ENCOUNTER — HOSPITAL ENCOUNTER (OUTPATIENT)
Dept: INFUSION THERAPY | Age: 76
Setting detail: INFUSION SERIES
Discharge: HOME | End: 2021-03-31
Payer: MEDICARE

## 2021-03-31 VITALS
RESPIRATION RATE: 18 BRPM | HEART RATE: 78 BPM | SYSTOLIC BLOOD PRESSURE: 116 MMHG | TEMPERATURE: 97.5 F | DIASTOLIC BLOOD PRESSURE: 52 MMHG

## 2021-03-31 DIAGNOSIS — R78.81 BACTEREMIA: Primary | ICD-10-CM

## 2021-03-31 PROCEDURE — 96365 THER/PROPH/DIAG IV INF INIT: CPT

## 2021-03-31 PROCEDURE — 2580000003 HC RX 258: Performed by: INTERNAL MEDICINE

## 2021-03-31 PROCEDURE — 6360000002 HC RX W HCPCS: Performed by: INTERNAL MEDICINE

## 2021-03-31 RX ADMIN — CEFTRIAXONE SODIUM 1000 MG: 1 INJECTION, POWDER, FOR SOLUTION INTRAMUSCULAR; INTRAVENOUS at 11:37

## 2021-03-31 NOTE — FLOWSHEET NOTE
Infusion complete, patient tolerated well.  Left unit via ambulation, all equipment cleaned after discharge

## 2021-04-01 ENCOUNTER — HOSPITAL ENCOUNTER (OUTPATIENT)
Dept: INFUSION THERAPY | Age: 76
Setting detail: INFUSION SERIES
Discharge: HOME | End: 2021-04-01
Payer: MEDICARE

## 2021-04-01 VITALS
SYSTOLIC BLOOD PRESSURE: 132 MMHG | TEMPERATURE: 98.2 F | DIASTOLIC BLOOD PRESSURE: 61 MMHG | RESPIRATION RATE: 18 BRPM | HEART RATE: 79 BPM

## 2021-04-01 DIAGNOSIS — R78.81 BACTEREMIA: Primary | ICD-10-CM

## 2021-04-01 PROCEDURE — 96365 THER/PROPH/DIAG IV INF INIT: CPT

## 2021-04-01 PROCEDURE — 2580000003 HC RX 258: Performed by: INTERNAL MEDICINE

## 2021-04-01 PROCEDURE — 6360000002 HC RX W HCPCS: Performed by: INTERNAL MEDICINE

## 2021-04-01 RX ADMIN — CEFTRIAXONE SODIUM 1000 MG: 1 INJECTION, POWDER, FOR SOLUTION INTRAMUSCULAR; INTRAVENOUS at 12:14

## 2021-04-01 NOTE — FLOWSHEET NOTE
Dr Mikell Dakins returned call, states okay to use Picc line as long as it flushes and infuses well.

## 2021-04-01 NOTE — FLOWSHEET NOTE
Picc line does not have a blood return. Flushed well. Call placed to Dr. Jane Marino to see if it is ok to infuse or do we need to cathflow. Made patient aware. Picc line dressing changed.

## 2021-04-01 NOTE — FLOWSHEET NOTE
Infusion is complete. Tolerated well. Dr. Griffin Mayo did not return call. Picc line flushed well. Left the unit via wheelchair. All equipment used in the care for this patient has been cleaned.

## 2021-04-02 ENCOUNTER — HOSPITAL ENCOUNTER (OUTPATIENT)
Dept: INFUSION THERAPY | Age: 76
Setting detail: INFUSION SERIES
Discharge: HOME | End: 2021-04-02
Payer: MEDICARE

## 2021-04-02 ENCOUNTER — APPOINTMENT (OUTPATIENT)
Dept: INFUSION THERAPY | Age: 76
End: 2021-04-02
Payer: MEDICARE

## 2021-04-02 VITALS
RESPIRATION RATE: 18 BRPM | DIASTOLIC BLOOD PRESSURE: 63 MMHG | SYSTOLIC BLOOD PRESSURE: 115 MMHG | HEART RATE: 84 BPM | TEMPERATURE: 98 F

## 2021-04-02 DIAGNOSIS — R78.81 BACTEREMIA: Primary | ICD-10-CM

## 2021-04-02 LAB
ANION GAP SERPL CALCULATED.3IONS-SCNC: 12 MEQ/L (ref 9–15)
BUN BLDV-MCNC: 14 MG/DL (ref 8–23)
C-REACTIVE PROTEIN, HIGH SENSITIVITY: 26.6 MG/L (ref 0–5)
CALCIUM SERPL-MCNC: 9.3 MG/DL (ref 8.5–9.9)
CHLORIDE BLD-SCNC: 102 MEQ/L (ref 95–107)
CO2: 24 MEQ/L (ref 20–31)
CREAT SERPL-MCNC: 1.06 MG/DL (ref 0.5–0.9)
GFR AFRICAN AMERICAN: >60
GFR NON-AFRICAN AMERICAN: 50.3
GLUCOSE BLD-MCNC: 201 MG/DL (ref 70–99)
HCT VFR BLD CALC: 37.6 % (ref 37–47)
HEMOGLOBIN: 12.5 G/DL (ref 12–16)
MCH RBC QN AUTO: 29.7 PG (ref 27–31.3)
MCHC RBC AUTO-ENTMCNC: 33.3 % (ref 33–37)
MCV RBC AUTO: 89.4 FL (ref 82–100)
PDW BLD-RTO: 13.7 % (ref 11.5–14.5)
PLATELET # BLD: 343 K/UL (ref 130–400)
POTASSIUM SERPL-SCNC: 3.2 MEQ/L (ref 3.4–4.9)
RBC # BLD: 4.2 M/UL (ref 4.2–5.4)
SODIUM BLD-SCNC: 138 MEQ/L (ref 135–144)
WBC # BLD: 8.1 K/UL (ref 4.8–10.8)

## 2021-04-02 PROCEDURE — 96365 THER/PROPH/DIAG IV INF INIT: CPT

## 2021-04-02 PROCEDURE — 86141 C-REACTIVE PROTEIN HS: CPT

## 2021-04-02 PROCEDURE — 6360000002 HC RX W HCPCS: Performed by: INTERNAL MEDICINE

## 2021-04-02 PROCEDURE — 36415 COLL VENOUS BLD VENIPUNCTURE: CPT

## 2021-04-02 PROCEDURE — 2580000003 HC RX 258: Performed by: INTERNAL MEDICINE

## 2021-04-02 PROCEDURE — 80048 BASIC METABOLIC PNL TOTAL CA: CPT

## 2021-04-02 PROCEDURE — 85027 COMPLETE CBC AUTOMATED: CPT

## 2021-04-02 RX ADMIN — CEFTRIAXONE SODIUM 1000 MG: 1 INJECTION, POWDER, FOR SOLUTION INTRAMUSCULAR; INTRAVENOUS at 11:51

## 2021-04-02 NOTE — FLOWSHEET NOTE
Unable to obtain blood via picc line, picc line flushing with no difficulty. Obtained peripheral blood sample and sent to lab.

## 2021-04-02 NOTE — FLOWSHEET NOTE
Infusion complete, patient tolerated well. Left unit via wheelchair with spouse assistance. All equipment cleaned after discharge.

## 2021-04-03 ENCOUNTER — HOSPITAL ENCOUNTER (OUTPATIENT)
Dept: INFUSION THERAPY | Age: 76
Setting detail: INFUSION SERIES
Discharge: HOME | End: 2021-04-03
Payer: MEDICARE

## 2021-04-03 VITALS
RESPIRATION RATE: 18 BRPM | TEMPERATURE: 96.8 F | HEART RATE: 79 BPM | SYSTOLIC BLOOD PRESSURE: 119 MMHG | DIASTOLIC BLOOD PRESSURE: 63 MMHG

## 2021-04-03 DIAGNOSIS — R78.81 BACTEREMIA: Primary | ICD-10-CM

## 2021-04-03 PROCEDURE — 2580000003 HC RX 258: Performed by: INTERNAL MEDICINE

## 2021-04-03 PROCEDURE — 6360000002 HC RX W HCPCS: Performed by: INTERNAL MEDICINE

## 2021-04-03 PROCEDURE — 96365 THER/PROPH/DIAG IV INF INIT: CPT

## 2021-04-03 RX ADMIN — CEFTRIAXONE SODIUM 1000 MG: 1 INJECTION, POWDER, FOR SOLUTION INTRAMUSCULAR; INTRAVENOUS at 09:52

## 2021-04-03 NOTE — FLOWSHEET NOTE
No blood return from Picc line. Picc line  flushing and infusing with no difficulty, order noted from Dr Alexi Borden, okay to continue using as long as it is working well.

## 2021-04-03 NOTE — FLOWSHEET NOTE
Patient states that Dr Capo Orlando instructed her to call her early this week, as she is schedule to complete treatment after Monday's dose.

## 2021-04-04 ENCOUNTER — HOSPITAL ENCOUNTER (OUTPATIENT)
Dept: INFUSION THERAPY | Age: 76
Setting detail: INFUSION SERIES
Discharge: HOME | End: 2021-04-04
Payer: MEDICARE

## 2021-04-04 VITALS
RESPIRATION RATE: 18 BRPM | DIASTOLIC BLOOD PRESSURE: 106 MMHG | HEART RATE: 70 BPM | SYSTOLIC BLOOD PRESSURE: 120 MMHG | TEMPERATURE: 98.1 F | OXYGEN SATURATION: 97 %

## 2021-04-04 DIAGNOSIS — R78.81 BACTEREMIA: Primary | ICD-10-CM

## 2021-04-04 PROCEDURE — 2580000003 HC RX 258: Performed by: INTERNAL MEDICINE

## 2021-04-04 PROCEDURE — 6360000002 HC RX W HCPCS: Performed by: INTERNAL MEDICINE

## 2021-04-04 PROCEDURE — 96365 THER/PROPH/DIAG IV INF INIT: CPT

## 2021-04-04 RX ADMIN — CEFTRIAXONE SODIUM 1000 MG: 1 INJECTION, POWDER, FOR SOLUTION INTRAMUSCULAR; INTRAVENOUS at 11:56

## 2021-04-05 ENCOUNTER — HOSPITAL ENCOUNTER (OUTPATIENT)
Dept: INFUSION THERAPY | Age: 76
Setting detail: INFUSION SERIES
Discharge: HOME | End: 2021-04-05
Payer: MEDICARE

## 2021-04-05 ENCOUNTER — TELEPHONE (OUTPATIENT)
Dept: INFECTIOUS DISEASES | Age: 76
End: 2021-04-05

## 2021-04-05 VITALS
RESPIRATION RATE: 18 BRPM | DIASTOLIC BLOOD PRESSURE: 55 MMHG | TEMPERATURE: 98.5 F | HEART RATE: 79 BPM | SYSTOLIC BLOOD PRESSURE: 110 MMHG

## 2021-04-05 DIAGNOSIS — R78.81 BACTEREMIA: Primary | ICD-10-CM

## 2021-04-05 PROCEDURE — 96365 THER/PROPH/DIAG IV INF INIT: CPT

## 2021-04-05 PROCEDURE — 6360000002 HC RX W HCPCS: Performed by: INTERNAL MEDICINE

## 2021-04-05 PROCEDURE — 2580000003 HC RX 258: Performed by: INTERNAL MEDICINE

## 2021-04-05 PROCEDURE — 99211 OFF/OP EST MAY X REQ PHY/QHP: CPT

## 2021-04-05 RX ADMIN — CEFTRIAXONE SODIUM 1000 MG: 1 INJECTION, POWDER, FOR SOLUTION INTRAMUSCULAR; INTRAVENOUS at 11:47

## 2021-04-05 NOTE — TELEPHONE ENCOUNTER
Received call from patient,States today her last day for IV Abx. She would like to know if its okay to D/C Picc, D/C Abx? We will up date .

## 2021-04-06 ENCOUNTER — CARE COORDINATION (OUTPATIENT)
Dept: CASE MANAGEMENT | Age: 76
End: 2021-04-06

## 2021-04-06 NOTE — CARE COORDINATION
[facility] Transitions Follow Up Call    2021    Patient: Lala Ortiz  Patient : 1945   MRN: [id number]  Reason for Admission: Urinary tract infection- with hematuria- Sepsis  Discharge Date: 3/26/21 RARS: Readmission Risk Score: 7  Care Transitions       Care Transitions requests call back to P: [phone] for subsequent outreach. Care Transitions Subsequent and Final Call    Subsequent and Final Calls  Care Transitions Interventions  Other Interventions: Follow Up  No future appointments.     Frieda Hernandez RN

## 2021-04-14 ENCOUNTER — CARE COORDINATION (OUTPATIENT)
Dept: CASE MANAGEMENT | Age: 76
End: 2021-04-14

## 2021-04-14 NOTE — CARE COORDINATION
[facility] Transitions Follow Up Call    2021    Patient: Rafy Woodard  Patient : 1945   MRN: <[id number]>  Reason for Admission: Gram-negative bacteremia  UTI  Discharge Date: 3/26/21 RARS: Readmission Risk Score: 7         Spoke with: Roseann Huy states she has no further issues with her UTI. Pt. Has completed all antibiotic infusions. Her appetite is fair. She is unable to drink ensure or boost. Urinating without difficulty. No s/s of UTI at this time. Pt. Is making an appt to see her PCP Meaghan Velez next week. She has an appt at [facility] tomorrow regarding \"cysts in her pelvic area\" No new issues or concerns. Pt. States she does not need continued follow up with care coordination at this time. Final call. Needs to be reviewed by the provider   Additional needs identified to be addressed with provider No  none           Method of communication with provider : none    Discussed COVID-19 related testing which was available at this time. Test results were negative. Patient informed of results, if available? Yes    Care Transition Nurse (CTN) contacted the patient by telephone to follow up after admission on 3/23/21. Verified name and  with patient as identifiers. Addressed changes since last contact: symptom management-UTI  pain/infection  Discharged needs reviewed: none  Follow up appointment completed? Yes    Advance Care Planning:   Does patient have an Advance Directive:  reviewed and current. CTN reviewed discharge instructions, medical action plan and red flags with patient and discussed any barriers to care and/or understanding of plan of care after discharge. Discussed appropriate site of care based on symptoms and resources available to patient including: PCP, Specialist and When to call 911. The patient agrees to contact the PCP office for questions related to their healthcare.      Patients top risk factors for readmission: medical condition  Interventions to address risk factors: Obtained and reviewed discharge summary and/or continuity of care documents    Discussed follow-up appointments. If no appointment was previously scheduled, appointment scheduling offered: Yes Is follow up appointment scheduled within 7 days of discharge? Yes  Non-[facility] follow up appointment(s): [facility] 4/15/21    final call today based on severity of symptoms and risk factors. Plan for next call: n/a  CTN provided contact information for future needs. Care Transitions Subsequent and Final Call    Subsequent and Final Calls  Care Transitions Interventions  Other Interventions: Follow Up  No future appointments.     FAY Peralta LPN Care Transitions Nurse   [phone]

## 2023-01-01 ENCOUNTER — APPOINTMENT (OUTPATIENT)
Dept: PRIMARY CARE | Facility: CLINIC | Age: 78
End: 2023-01-01
Payer: MEDICARE